# Patient Record
Sex: FEMALE | Race: WHITE | Employment: UNEMPLOYED | URBAN - METROPOLITAN AREA
[De-identification: names, ages, dates, MRNs, and addresses within clinical notes are randomized per-mention and may not be internally consistent; named-entity substitution may affect disease eponyms.]

---

## 2018-11-01 ENCOUNTER — APPOINTMENT (OUTPATIENT)
Dept: CT IMAGING | Facility: HOSPITAL | Age: 43
End: 2018-11-01
Attending: Other
Payer: MEDICAID

## 2018-11-01 ENCOUNTER — APPOINTMENT (OUTPATIENT)
Dept: GENERAL RADIOLOGY | Facility: HOSPITAL | Age: 43
End: 2018-11-01
Attending: OBSTETRICS & GYNECOLOGY
Payer: MEDICAID

## 2018-11-01 ENCOUNTER — ANESTHESIA (OUTPATIENT)
Dept: OBGYN UNIT | Facility: HOSPITAL | Age: 43
End: 2018-11-01
Payer: MEDICAID

## 2018-11-01 ENCOUNTER — APPOINTMENT (OUTPATIENT)
Dept: CT IMAGING | Facility: HOSPITAL | Age: 43
End: 2018-11-01
Attending: OBSTETRICS & GYNECOLOGY
Payer: MEDICAID

## 2018-11-01 ENCOUNTER — ANESTHESIA EVENT (OUTPATIENT)
Dept: OBGYN UNIT | Facility: HOSPITAL | Age: 43
End: 2018-11-01
Payer: MEDICAID

## 2018-11-01 ENCOUNTER — HOSPITAL ENCOUNTER (INPATIENT)
Facility: HOSPITAL | Age: 43
LOS: 19 days | Discharge: HOME | End: 2018-11-20
Attending: OBSTETRICS & GYNECOLOGY | Admitting: OBSTETRICS & GYNECOLOGY
Payer: MEDICAID

## 2018-11-01 DIAGNOSIS — I60.7 RUPTURED CEREBRAL ANEURYSM (HCC): Primary | ICD-10-CM

## 2018-11-01 PROBLEM — Z34.90 NORMAL PREGNANCY: Status: ACTIVE | Noted: 2018-11-01

## 2018-11-01 PROCEDURE — 59514 CESAREAN DELIVERY ONLY: CPT | Performed by: OBSTETRICS & GYNECOLOGY

## 2018-11-01 PROCEDURE — 009630Z DRAINAGE OF CEREBRAL VENTRICLE WITH DRAINAGE DEVICE, PERCUTANEOUS APPROACH: ICD-10-PCS | Performed by: NEUROLOGICAL SURGERY

## 2018-11-01 PROCEDURE — 99221 1ST HOSP IP/OBS SF/LOW 40: CPT | Performed by: NEUROLOGICAL SURGERY

## 2018-11-01 PROCEDURE — 70450 CT HEAD/BRAIN W/O DYE: CPT | Performed by: OBSTETRICS & GYNECOLOGY

## 2018-11-01 PROCEDURE — 71045 X-RAY EXAM CHEST 1 VIEW: CPT | Performed by: OBSTETRICS & GYNECOLOGY

## 2018-11-01 PROCEDURE — 70496 CT ANGIOGRAPHY HEAD: CPT | Performed by: OTHER

## 2018-11-01 RX ORDER — LIDOCAINE HYDROCHLORIDE AND EPINEPHRINE 10; 10 MG/ML; UG/ML
INJECTION, SOLUTION INFILTRATION; PERINEURAL
Status: COMPLETED
Start: 2018-11-01 | End: 2018-11-01

## 2018-11-01 RX ORDER — DIPHENHYDRAMINE HYDROCHLORIDE 50 MG/ML
12.5 INJECTION INTRAMUSCULAR; INTRAVENOUS EVERY 4 HOURS PRN
Status: DISCONTINUED | OUTPATIENT
Start: 2018-11-01 | End: 2018-11-16

## 2018-11-01 RX ORDER — LIDOCAINE HYDROCHLORIDE 10 MG/ML
INJECTION, SOLUTION EPIDURAL; INFILTRATION; INTRACAUDAL; PERINEURAL
Status: DISCONTINUED
Start: 2018-11-01 | End: 2018-11-01 | Stop reason: WASHOUT

## 2018-11-01 RX ORDER — POLYETHYLENE GLYCOL 3350 17 G/17G
17 POWDER, FOR SOLUTION ORAL DAILY PRN
Status: DISCONTINUED | OUTPATIENT
Start: 2018-11-01 | End: 2018-11-20

## 2018-11-01 RX ORDER — CARBOPROST TROMETHAMINE 250 UG/ML
INJECTION, SOLUTION INTRAMUSCULAR
Status: DISCONTINUED
Start: 2018-11-01 | End: 2018-11-01 | Stop reason: WASHOUT

## 2018-11-01 RX ORDER — BISACODYL 10 MG
10 SUPPOSITORY, RECTAL RECTAL
Status: DISCONTINUED | OUTPATIENT
Start: 2018-11-01 | End: 2018-11-20

## 2018-11-01 RX ORDER — DOCUSATE SODIUM 100 MG/1
100 CAPSULE, LIQUID FILLED ORAL 2 TIMES DAILY
Status: DISCONTINUED | OUTPATIENT
Start: 2018-11-01 | End: 2018-11-16

## 2018-11-01 RX ORDER — NALBUPHINE HCL 10 MG/ML
2.5 AMPUL (ML) INJECTION EVERY 4 HOURS PRN
Status: DISCONTINUED | OUTPATIENT
Start: 2018-11-01 | End: 2018-11-16

## 2018-11-01 RX ORDER — CARBOPROST TROMETHAMINE 250 UG/ML
250 INJECTION, SOLUTION INTRAMUSCULAR ONCE
Status: DISCONTINUED | OUTPATIENT
Start: 2018-11-01 | End: 2018-11-20

## 2018-11-01 RX ORDER — SODIUM CHLORIDE, SODIUM LACTATE, POTASSIUM CHLORIDE, CALCIUM CHLORIDE 600; 310; 30; 20 MG/100ML; MG/100ML; MG/100ML; MG/100ML
INJECTION, SOLUTION INTRAVENOUS CONTINUOUS
Status: DISCONTINUED | OUTPATIENT
Start: 2018-11-01 | End: 2018-11-01 | Stop reason: HOSPADM

## 2018-11-01 RX ORDER — BETAMETHASONE SODIUM PHOSPHATE AND BETAMETHASONE ACETATE 3; 3 MG/ML; MG/ML
INJECTION, SUSPENSION INTRA-ARTICULAR; INTRALESIONAL; INTRAMUSCULAR; SOFT TISSUE
Status: DISPENSED
Start: 2018-11-01 | End: 2018-11-02

## 2018-11-01 RX ORDER — CEFAZOLIN SODIUM/WATER 2 G/20 ML
SYRINGE (ML) INTRAVENOUS
Status: DISPENSED
Start: 2018-11-01 | End: 2018-11-02

## 2018-11-01 RX ORDER — ACETAMINOPHEN 325 MG/1
650 TABLET ORAL EVERY 4 HOURS PRN
Status: DISCONTINUED | OUTPATIENT
Start: 2018-11-01 | End: 2018-11-02

## 2018-11-01 RX ORDER — ACETAMINOPHEN 650 MG/1
650 SUPPOSITORY RECTAL EVERY 4 HOURS PRN
Status: DISCONTINUED | OUTPATIENT
Start: 2018-11-01 | End: 2018-11-02

## 2018-11-01 RX ORDER — LORAZEPAM 2 MG/ML
1 INJECTION INTRAMUSCULAR
Status: DISCONTINUED | OUTPATIENT
Start: 2018-11-01 | End: 2018-11-16

## 2018-11-01 RX ORDER — ONDANSETRON 2 MG/ML
4 INJECTION INTRAMUSCULAR; INTRAVENOUS EVERY 4 HOURS PRN
Status: DISCONTINUED | OUTPATIENT
Start: 2018-11-01 | End: 2018-11-20

## 2018-11-01 RX ORDER — IBUPROFEN 200 MG
200 TABLET ORAL ONCE
Status: ON HOLD | COMMUNITY
End: 2018-11-19

## 2018-11-01 RX ORDER — IBUPROFEN 600 MG/1
600 TABLET ORAL ONCE AS NEEDED
Status: DISCONTINUED | OUTPATIENT
Start: 2018-11-01 | End: 2018-11-01 | Stop reason: HOSPADM

## 2018-11-01 RX ORDER — LIDOCAINE HYDROCHLORIDE 10 MG/ML
20 INJECTION, SOLUTION EPIDURAL; INFILTRATION; INTRACAUDAL; PERINEURAL ONCE
Status: DISCONTINUED | OUTPATIENT
Start: 2018-11-01 | End: 2018-11-20

## 2018-11-01 RX ORDER — SODIUM PHOSPHATE, DIBASIC AND SODIUM PHOSPHATE, MONOBASIC 7; 19 G/133ML; G/133ML
1 ENEMA RECTAL ONCE AS NEEDED
Status: DISCONTINUED | OUTPATIENT
Start: 2018-11-01 | End: 2018-11-20

## 2018-11-01 RX ORDER — DEXTROSE, SODIUM CHLORIDE, SODIUM LACTATE, POTASSIUM CHLORIDE, AND CALCIUM CHLORIDE 5; .6; .31; .03; .02 G/100ML; G/100ML; G/100ML; G/100ML; G/100ML
INJECTION, SOLUTION INTRAVENOUS AS NEEDED
Status: DISCONTINUED | OUTPATIENT
Start: 2018-11-01 | End: 2018-11-01 | Stop reason: HOSPADM

## 2018-11-01 RX ORDER — LABETALOL HYDROCHLORIDE 5 MG/ML
10 INJECTION, SOLUTION INTRAVENOUS EVERY 10 MIN PRN
Status: DISCONTINUED | OUTPATIENT
Start: 2018-11-01 | End: 2018-11-16

## 2018-11-01 RX ORDER — SODIUM CHLORIDE 9 MG/ML
INJECTION, SOLUTION INTRAVENOUS CONTINUOUS
Status: ACTIVE | OUTPATIENT
Start: 2018-11-01 | End: 2018-11-03

## 2018-11-01 RX ORDER — CEFAZOLIN SODIUM/WATER 2 G/20 ML
SYRINGE (ML) INTRAVENOUS
Status: DISCONTINUED | OUTPATIENT
Start: 2018-11-01 | End: 2018-11-01 | Stop reason: HOSPADM

## 2018-11-01 RX ORDER — BETAMETHASONE SODIUM PHOSPHATE AND BETAMETHASONE ACETATE 3; 3 MG/ML; MG/ML
12 INJECTION, SUSPENSION INTRA-ARTICULAR; INTRALESIONAL; INTRAMUSCULAR; SOFT TISSUE EVERY 24 HOURS
Status: DISCONTINUED | OUTPATIENT
Start: 2018-11-01 | End: 2018-11-02

## 2018-11-01 RX ORDER — SENNOSIDES 8.6 MG
17.2 TABLET ORAL NIGHTLY
Status: DISCONTINUED | OUTPATIENT
Start: 2018-11-01 | End: 2018-11-20

## 2018-11-01 RX ORDER — MORPHINE SULFATE 4 MG/ML
1 INJECTION, SOLUTION INTRAMUSCULAR; INTRAVENOUS EVERY 2 HOUR PRN
Status: DISCONTINUED | OUTPATIENT
Start: 2018-11-01 | End: 2018-11-16

## 2018-11-01 RX ORDER — CEFAZOLIN SODIUM/WATER 2 G/20 ML
2 SYRINGE (ML) INTRAVENOUS
Status: DISCONTINUED | OUTPATIENT
Start: 2018-11-01 | End: 2018-11-01 | Stop reason: HOSPADM

## 2018-11-01 RX ORDER — ONDANSETRON 2 MG/ML
4 INJECTION INTRAMUSCULAR; INTRAVENOUS EVERY 6 HOURS PRN
Status: DISCONTINUED | OUTPATIENT
Start: 2018-11-01 | End: 2018-11-02

## 2018-11-01 RX ORDER — TERBUTALINE SULFATE 1 MG/ML
0.25 INJECTION, SOLUTION SUBCUTANEOUS AS NEEDED
Status: DISCONTINUED | OUTPATIENT
Start: 2018-11-01 | End: 2018-11-01 | Stop reason: HOSPADM

## 2018-11-01 RX ORDER — TRISODIUM CITRATE DIHYDRATE AND CITRIC ACID MONOHYDRATE 500; 334 MG/5ML; MG/5ML
30 SOLUTION ORAL AS NEEDED
Status: DISCONTINUED | OUTPATIENT
Start: 2018-11-01 | End: 2018-11-01 | Stop reason: HOSPADM

## 2018-11-01 RX ORDER — NALOXONE HYDROCHLORIDE 0.4 MG/ML
0.08 INJECTION, SOLUTION INTRAMUSCULAR; INTRAVENOUS; SUBCUTANEOUS
Status: DISCONTINUED | OUTPATIENT
Start: 2018-11-01 | End: 2018-11-16

## 2018-11-01 RX ORDER — METOCLOPRAMIDE HYDROCHLORIDE 5 MG/ML
10 INJECTION INTRAMUSCULAR; INTRAVENOUS EVERY 8 HOURS PRN
Status: DISCONTINUED | OUTPATIENT
Start: 2018-11-01 | End: 2018-11-20

## 2018-11-01 RX ORDER — PRENATAL VIT/IRON FUM/FOLIC AC 27 MG-1 MG
TABLET ORAL
Status: ON HOLD | COMMUNITY
End: 2018-11-19

## 2018-11-01 RX ORDER — ONDANSETRON 2 MG/ML
4 INJECTION INTRAMUSCULAR; INTRAVENOUS EVERY 6 HOURS PRN
Status: DISCONTINUED | OUTPATIENT
Start: 2018-11-01 | End: 2018-11-01

## 2018-11-01 RX ORDER — MORPHINE SULFATE 4 MG/ML
2 INJECTION, SOLUTION INTRAMUSCULAR; INTRAVENOUS EVERY 2 HOUR PRN
Status: DISCONTINUED | OUTPATIENT
Start: 2018-11-01 | End: 2018-11-16

## 2018-11-01 NOTE — CONSULTS
[facility]  Neurosurgery Consult  2018    Pedro Rivers Patient Status:  Inpatient    9/3/1975 MRN [id number]   [facility] 6NE-A Attending Chula Landry, 160[address] [facility] Day # 0 PCP No primary care provider on file.      DEE DEE imaging  Patient still awakened from anesthesia from her   The patient is easily arousable and very conversive once awake  We will follow closely      Luh Little MD   [address]  2018

## 2018-11-01 NOTE — CONSULTS
[facility]  Neurocritical Care       Name: Jaison Jacobsen  MRN: [id number]  Admission Date/Time: 11/1/2018  1:04 PM  Primary Care Provider:  No primary care provider on file.         Reason for Consultation:   SAH    HPI:   Zakia Other Topics      Concerns:        Not on file    Social History Narrative      Not on file    No family history on file.     Current Meds:    Current Facility-Administered Medications:  betamethasone sod phos & acet (CELESTONE) 6 (3-3) MG/ML injection 12 m mL 30 mL Oral Daily PRN   bisacodyl (DULCOLAX) rectal suppository 10 mg 10 mg Rectal Daily PRN   FLEET ENEMA (FLEET) 7-19 GM/118ML enema 133 mL 1 enema Rectal Once PRN   ondansetron HCl (ZOFRAN) injection 4 mg 4 mg Intravenous Q6H PRN   Or      Metoclopram hemorrhage, most pronounced at the basilar cisterns, and cervical cranial junction. There is also a small amount of intraventricular blood layering in both lateral ventricles. The gray-white matter interfaces indistinct.   There is a slender arachnoid cys pending for potential EVD  - Keppra 2gm followed by 1gm BID  - platelets and coag wnl  - Nimotop initiation delayed due to stabilization of airway and prolonged neurointerventional needs  Cardiac:  - Systolic BP Goal <401  - EKG and cardiac monitor wnl  -

## 2018-11-01 NOTE — IMAGING NOTE
Indication: Patient with seizures. ____________________________________________________________________________  History: Age: 44 years. Maternal age at [facility] 40: 44 years.   ____________________________________________________________________________  Dating:

## 2018-11-01 NOTE — PROGRESS NOTES
Pt's daughter Kerri Amador called, notified of pt's change in condition due to new information obtained after CT scan, pt's daughter asked to return to hospital to give necessary medical history/ information and provide consents for pt.  Pt's daughter tearful, re

## 2018-11-01 NOTE — CONSULTS
[facility]    Maternal-Fetal Medicine Inpatient Consultation    Date of Admission:  11/1/2018  Date of Consult:  11/1/2018    Reason for Consult:   Suspected eclampsia    History of Present Illness:  Isreal Ranulfo is a a(n) 44year old female G4P radiology to review the case.       Review of History:      OB History:    OB History    Para Term  AB Living   1             SAB TAB Ectopic Multiple Live Births                  # Outcome Date GA Lbr Corbin/2nd Weight Sex Delivery Anes PTL Lv ECLAMPSIA  In light of this immediate delivery is advised. Magnesium sulfate was initiated. Magnesium sulfate should be continued postpartum for at least 24 hours after delivery.   The patient's blood pressure was stable upon admission and did not warrant

## 2018-11-01 NOTE — PROGRESS NOTES
[facility] Pharmacy Note:  Pain Consult    Melissa Patel is a 44year old female started on Dilaudid PCA by Dr. Joselito Nguyễn. Pharmacy was consulted to review medication profile and to discontinue previously ordered narcotics and sedatives.     Medication

## 2018-11-01 NOTE — ANESTHESIA POSTPROCEDURE EVALUATION
[address] Patient Status:  Inpatient   Age/Gender 44year old female MRN [id number]   Location [address] [facility] Attending Ashlyn Jarvis, Valdo Houston, [address] [facility] Day # 0 PCP No primary care provider on file.        Anesth

## 2018-11-01 NOTE — ANESTHESIA PREPROCEDURE EVALUATION
PRE-OP EVALUATION    Patient Name: Chepe Costa    Pre-op Diagnosis: * No pre-op diagnosis entered *    Procedure(s):      Surgeon(s) and Role:     * Heather Reagan DO - Bob    Pre-op vitals reviewed.         There is no height or weight on roxy Patient has no allergy information on record.       Anesthesia Evaluation        Anesthetic Complications  (-) history of anesthetic complications         GI/Hepatic/Renal                                 Cardiovascular  Comment: Unable to fully assess, labs

## 2018-11-01 NOTE — OPERATIVE REPORT
[facility]    Post-Operative Note    Cyndy Albrecht Patient Status:  Inpatient    9/3/1975 MRN [id number]   Location 18[address] [facility] Attending Damián Chavez, [address] [facility] Day # 0 PCP No primary care provider on file. The cervix was dilated with a ring forcep which was then passed off of the field. The bladder blade was replaced. The first layer of the hysterotomy was closed using 0-vycril . A second imbricating layer was placed with same suture.   The incision was in

## 2018-11-01 NOTE — PROGRESS NOTES
Pt in OB Recovery room, able to answer minimal questions, in pain after emergent  section, unable to complete admission at this time, no family at bedside, daughter in route. Will attempt to obtain medical history once family at bedside.

## 2018-11-01 NOTE — PROGRESS NOTES
11/01/18 7998   Clinical Encounter Type   Visited With Patient not available;Health care provider  (RN)    followed up on consult for spiritual care. Nursing stated patient has a daughter, but she is not available at this time.   Please page damaris

## 2018-11-01 NOTE — PROGRESS NOTES
Patient brought to CT scan in bed. This RN and Lissette Haider RN and transportation escorted pt to CAT Scan.

## 2018-11-01 NOTE — PROGRESS NOTES
In CT Scan with patient. Radiologist spoke with this RN and instructed me to take her immediately to ICU. ICU bed obtained. Pt put on tele monitor and O2 per NC at 2L.   Anesthesia Dr Cecile Streeter occompanied and this RN and Karen Wang RN transferred pt to ICU o

## 2018-11-01 NOTE — H&P
Vear Sandifer is a 44year old female  No LMP recorded. Patient is pregnant. No chief complaint on file.   .  Patient presented by ambulance had seizure at home, about 31 weeks by [facility], limitewezekiel history, patient postictal  OBSTETRICS HISTORY:  OB Hi discharge, vaginal itching  Musculoskeletal:  denies back pain. Skin/Breast:  Denies any breast pain, lumps, or discharge. Neurological:  denies headaches, extremity weakness or numbness. Psychiatric: denies depression or anxiety.   Endocrine:   denies

## 2018-11-02 ENCOUNTER — ANESTHESIA (OUTPATIENT)
Dept: PERIOP | Facility: HOSPITAL | Age: 43
End: 2018-11-02

## 2018-11-02 ENCOUNTER — APPOINTMENT (OUTPATIENT)
Dept: CV DIAGNOSTICS | Facility: HOSPITAL | Age: 43
End: 2018-11-02
Attending: Other
Payer: MEDICAID

## 2018-11-02 ENCOUNTER — APPOINTMENT (OUTPATIENT)
Dept: INTERVENTIONAL RADIOLOGY/VASCULAR | Facility: HOSPITAL | Age: 43
End: 2018-11-02
Payer: MEDICAID

## 2018-11-02 ENCOUNTER — ANESTHESIA EVENT (OUTPATIENT)
Dept: PERIOP | Facility: HOSPITAL | Age: 43
End: 2018-11-02

## 2018-11-02 ENCOUNTER — APPOINTMENT (OUTPATIENT)
Dept: ULTRASOUND IMAGING | Facility: HOSPITAL | Age: 43
End: 2018-11-02
Attending: Other
Payer: MEDICAID

## 2018-11-02 ENCOUNTER — APPOINTMENT (OUTPATIENT)
Dept: CT IMAGING | Facility: HOSPITAL | Age: 43
End: 2018-11-02
Attending: NURSE PRACTITIONER
Payer: MEDICAID

## 2018-11-02 PROCEDURE — 93886 INTRACRANIAL COMPLETE STUDY: CPT | Performed by: OTHER

## 2018-11-02 PROCEDURE — 30233N1 TRANSFUSION OF NONAUTOLOGOUS RED BLOOD CELLS INTO PERIPHERAL VEIN, PERCUTANEOUS APPROACH: ICD-10-PCS | Performed by: OBSTETRICS & GYNECOLOGY

## 2018-11-02 PROCEDURE — B31RYZZ FLUOROSCOPY OF INTRACRANIAL ARTERIES USING OTHER CONTRAST: ICD-10-PCS

## 2018-11-02 PROCEDURE — B31CYZZ FLUOROSCOPY OF BILATERAL EXTERNAL CAROTID ARTERIES USING OTHER CONTRAST: ICD-10-PCS

## 2018-11-02 PROCEDURE — B31QYZZ FLUOROSCOPY OF CERVICO-CEREBRAL ARCH USING OTHER CONTRAST: ICD-10-PCS

## 2018-11-02 PROCEDURE — B318YZZ FLUOROSCOPY OF BILATERAL INTERNAL CAROTID ARTERIES USING OTHER CONTRAST: ICD-10-PCS

## 2018-11-02 PROCEDURE — B312YZZ FLUOROSCOPY OF LEFT SUBCLAVIAN ARTERY USING OTHER CONTRAST: ICD-10-PCS

## 2018-11-02 PROCEDURE — B31GYZZ FLUOROSCOPY OF BILATERAL VERTEBRAL ARTERIES USING OTHER CONTRAST: ICD-10-PCS

## 2018-11-02 PROCEDURE — B315YZZ FLUOROSCOPY OF BILATERAL COMMON CAROTID ARTERIES USING OTHER CONTRAST: ICD-10-PCS

## 2018-11-02 PROCEDURE — 70450 CT HEAD/BRAIN W/O DYE: CPT | Performed by: NURSE PRACTITIONER

## 2018-11-02 PROCEDURE — B31LYZZ FLUOROSCOPY OF INTERCOSTAL AND BRONCHIAL ARTERIES USING OTHER CONTRAST: ICD-10-PCS

## 2018-11-02 PROCEDURE — 03LG3DZ OCCLUSION OF INTRACRANIAL ARTERY WITH INTRALUMINAL DEVICE, PERCUTANEOUS APPROACH: ICD-10-PCS

## 2018-11-02 PROCEDURE — B311YZZ FLUOROSCOPY OF RIGHT BRACHIOCEPHALIC-SUBCLAVIAN ARTERY USING OTHER CONTRAST: ICD-10-PCS

## 2018-11-02 RX ORDER — LIDOCAINE HYDROCHLORIDE 10 MG/ML
INJECTION, SOLUTION INFILTRATION; PERINEURAL
Status: COMPLETED
Start: 2018-11-02 | End: 2018-11-02

## 2018-11-02 RX ORDER — HYDROCODONE BITARTRATE AND ACETAMINOPHEN 5; 325 MG/1; MG/1
2 TABLET ORAL AS NEEDED
Status: COMPLETED | OUTPATIENT
Start: 2018-11-02 | End: 2018-11-03

## 2018-11-02 RX ORDER — CEFAZOLIN SODIUM 1 G/3ML
INJECTION, POWDER, FOR SOLUTION INTRAMUSCULAR; INTRAVENOUS
Status: COMPLETED
Start: 2018-11-02 | End: 2018-11-02

## 2018-11-02 RX ORDER — ASPIRIN 325 MG
325 TABLET ORAL DAILY
Status: DISCONTINUED | OUTPATIENT
Start: 2018-11-02 | End: 2018-11-20

## 2018-11-02 RX ORDER — NALOXONE HYDROCHLORIDE 0.4 MG/ML
80 INJECTION, SOLUTION INTRAMUSCULAR; INTRAVENOUS; SUBCUTANEOUS AS NEEDED
Status: ACTIVE | OUTPATIENT
Start: 2018-11-02 | End: 2018-11-02

## 2018-11-02 RX ORDER — HYDROCODONE BITARTRATE AND ACETAMINOPHEN 5; 325 MG/1; MG/1
1 TABLET ORAL AS NEEDED
Status: COMPLETED | OUTPATIENT
Start: 2018-11-02 | End: 2018-11-03

## 2018-11-02 RX ORDER — DIPHENHYDRAMINE HYDROCHLORIDE 50 MG/ML
25 INJECTION INTRAMUSCULAR; INTRAVENOUS ONCE AS NEEDED
Status: ACTIVE | OUTPATIENT
Start: 2018-11-02 | End: 2018-11-02

## 2018-11-02 RX ORDER — NALOXONE HYDROCHLORIDE 0.4 MG/ML
80 INJECTION, SOLUTION INTRAMUSCULAR; INTRAVENOUS; SUBCUTANEOUS AS NEEDED
Status: ACTIVE | OUTPATIENT
Start: 2018-11-02 | End: 2018-11-03

## 2018-11-02 RX ORDER — SODIUM CHLORIDE 9 MG/ML
INJECTION, SOLUTION INTRAVENOUS ONCE
Status: DISCONTINUED | OUTPATIENT
Start: 2018-11-02 | End: 2018-11-15

## 2018-11-02 RX ORDER — KETOROLAC TROMETHAMINE 30 MG/ML
30 INJECTION, SOLUTION INTRAMUSCULAR; INTRAVENOUS ONCE AS NEEDED
Status: ACTIVE | OUTPATIENT
Start: 2018-11-02 | End: 2018-11-02

## 2018-11-02 RX ORDER — SODIUM CHLORIDE, SODIUM LACTATE, POTASSIUM CHLORIDE, CALCIUM CHLORIDE 600; 310; 30; 20 MG/100ML; MG/100ML; MG/100ML; MG/100ML
INJECTION, SOLUTION INTRAVENOUS CONTINUOUS
Status: DISCONTINUED | OUTPATIENT
Start: 2018-11-02 | End: 2018-11-05

## 2018-11-02 RX ORDER — NALBUPHINE HCL 10 MG/ML
2.5 AMPUL (ML) INJECTION
Status: DISCONTINUED | OUTPATIENT
Start: 2018-11-02 | End: 2018-11-16

## 2018-11-02 RX ORDER — MIDAZOLAM HYDROCHLORIDE 1 MG/ML
1 INJECTION INTRAMUSCULAR; INTRAVENOUS EVERY 5 MIN PRN
Status: ACTIVE | OUTPATIENT
Start: 2018-11-02 | End: 2018-11-02

## 2018-11-02 RX ORDER — ONDANSETRON 2 MG/ML
4 INJECTION INTRAMUSCULAR; INTRAVENOUS ONCE AS NEEDED
Status: ACTIVE | OUTPATIENT
Start: 2018-11-02 | End: 2018-11-02

## 2018-11-02 RX ORDER — ASPIRIN 300 MG
300 SUPPOSITORY, RECTAL RECTAL DAILY
Status: DISCONTINUED | OUTPATIENT
Start: 2018-11-02 | End: 2018-11-19

## 2018-11-02 RX ORDER — ONDANSETRON 2 MG/ML
4 INJECTION INTRAMUSCULAR; INTRAVENOUS AS NEEDED
Status: ACTIVE | OUTPATIENT
Start: 2018-11-02 | End: 2018-11-02

## 2018-11-02 RX ORDER — ACETAMINOPHEN 10 MG/ML
1000 INJECTION, SOLUTION INTRAVENOUS EVERY 6 HOURS PRN
Status: DISCONTINUED | OUTPATIENT
Start: 2018-11-02 | End: 2018-11-05

## 2018-11-02 RX ORDER — ASPIRIN 300 MG
300 SUPPOSITORY, RECTAL RECTAL DAILY
Status: DISCONTINUED | OUTPATIENT
Start: 2018-11-02 | End: 2018-11-02 | Stop reason: SDUPTHER

## 2018-11-02 RX ORDER — ASPIRIN 325 MG
325 TABLET ORAL DAILY
Status: DISCONTINUED | OUTPATIENT
Start: 2018-11-02 | End: 2018-11-02 | Stop reason: SDUPTHER

## 2018-11-02 RX ORDER — PHENYLEPHRINE HCL IN 0.9% NACL 50MG/250ML
PLASTIC BAG, INJECTION (ML) INTRAVENOUS CONTINUOUS
Status: DISCONTINUED | OUTPATIENT
Start: 2018-11-02 | End: 2018-11-16

## 2018-11-02 RX ORDER — MORPHINE SULFATE 4 MG/ML
2 INJECTION, SOLUTION INTRAMUSCULAR; INTRAVENOUS EVERY 5 MIN PRN
Status: ACTIVE | OUTPATIENT
Start: 2018-11-02 | End: 2018-11-02

## 2018-11-02 RX ORDER — HEPARIN SODIUM 5000 [USP'U]/ML
INJECTION, SOLUTION INTRAVENOUS; SUBCUTANEOUS
Status: COMPLETED
Start: 2018-11-02 | End: 2018-11-02

## 2018-11-02 RX ORDER — METOCLOPRAMIDE HYDROCHLORIDE 5 MG/ML
10 INJECTION INTRAMUSCULAR; INTRAVENOUS AS NEEDED
Status: ACTIVE | OUTPATIENT
Start: 2018-11-02 | End: 2018-11-02

## 2018-11-02 RX ORDER — VERAPAMIL HYDROCHLORIDE 2.5 MG/ML
INJECTION, SOLUTION INTRAVENOUS
Status: DISCONTINUED
Start: 2018-11-02 | End: 2018-11-02 | Stop reason: WASHOUT

## 2018-11-02 NOTE — PROGRESS NOTES
11/02/18 1824   Clinical Encounter Type   Visited With Patient not available   Routine Visit ( responding to consult requesting spiritual counseling.)      attempted to initiate a pastoral care visit with patient per consult requesting s

## 2018-11-02 NOTE — ANESTHESIA PREPROCEDURE EVALUATION
PRE-OP EVALUATION    Patient Name: Radha Batista    Pre-op Diagnosis: * No pre-op diagnosis entered *    * No procedures listed *    * No surgeons found in log *    Pre-op vitals reviewed.   Temp: 97.9 °F (36.6 °C)  Pulse: 88  Resp: 20  BP: 144/87 0.9 % 100 mL IVPB 1,000 mg Intravenous Q12H   NiMODipine (NIMOTOP) 60 MG/20ML oral solution 60 mg 60 mg Oral 6 times per day   0.9%  NaCl infusion  Intravenous Continuous   acetaminophen (TYLENOL) tab 650 mg 650 mg Oral Q4H PRN   Or      acetaminophen (TYL GI/Hepatic/Renal                                 Cardiovascular      ECG reviewed.           (+) obesity                                       Endo/Other                       (+) thrombocytopenia           Pulmonary                           Neuro/Psych

## 2018-11-02 NOTE — PROGRESS NOTES
[facility]  Neurosurgery Progress Note    Radha Batista Patient Status:  Inpatient    9/3/1975 MRN [id number]   [facility] 6NE-A Attending Gonzales Henry, [address] [facility] Day # 1 PCP No primary care provider on file.      Subjecti Extensive subarachnoid hemorrhage is again demonstrated in the basilar cisterns and extending to the cervicocranial junction, greater on the left. Increased lateral and 3rd ventricle enlargement.   Dependent blood layering out in the occipital horns of cmH20  Do not drain more than 20 ml in one hour  If more than 20 ml output, clamp EVD for 30 minutes  Call if output consistently more than 20 ml hr  Nimodipine, BP parameters per neuroIR  Keppra per neuroCC        Viktoria Mcconnell, ASHLEYN  [address]

## 2018-11-02 NOTE — PLAN OF CARE
Assumed care of pt from OB at [facility] 81.. Pt lethargic, but able to wake up and follow commands with encouragement. Pt taken for CTA scan as ordered.  Critical results called to this RN at [phone] mentioning extensive subarachnoid and intraventricular hemorrhage a

## 2018-11-02 NOTE — PROCEDURES
rui properly identified. Incision planned. Patient sterilely prepped and draped. 6 mls of 1% lido with epi injected. Incision down to periosteum. . Twist drill craniotomy. Wound flushed. Dural opening with 11 blade.  Passage of evd with csf.  evd tu

## 2018-11-02 NOTE — ANESTHESIA POSTPROCEDURE EVALUATION
[address] Patient Status:  Inpatient   Age/Gender 44year old female MRN [id number]   [facility] 6NE-A Attending Damián Chavez, [address] [facility] Day # 1 PCP No primary care provider on file.        Anesthesia Post-o

## 2018-11-02 NOTE — PROGRESS NOTES
POD#1 s/p RCS  Patient is sedated s/p ruptured brain aneurism coil placement in nuero lab    /84   Pulse 95   Temp 99.1 °F (37.3 °C) (Temporal)   Resp 22   Ht 57\"   Wt 161 lb 6 oz   SpO2 99%   Breastfeeding?  Yes   BMI 34.92 kg/m²     Abdomen: soft,

## 2018-11-02 NOTE — PROGRESS NOTES
[address] Pharmacy Note:  Acetaminophen Therapeutic Duplication      Kimberly Galdamez is a(n) 44year old female who has been prescribed both IV acetaminophen (Ofirmev) and PO/VA acetaminophen .  PO/VA acetaminophen was discontinued per P&T approved protoc

## 2018-11-02 NOTE — OCCUPATIONAL THERAPY NOTE
OT order received. Attempted to see pt for OT eval; however pt on bedrest at this time. Spoke with RN who was requesting to follow up again tomorrow. Will check tomorrow as appropriate.

## 2018-11-02 NOTE — SLP NOTE
Order received; chart reviewed. Patient currently out of room for procedure. Will f/u tomorrow as appropriate.    Kimi Mayfield , pager [phone]

## 2018-11-02 NOTE — PLAN OF CARE
Problem: NEUROLOGICAL - ADULT  Goal: Achieves stable or improved neurological status  INTERVENTIONS  - Assess for and report changes in neurological status  - Initiate measures to prevent increased intracranial pressure  - Maintain blood pressure and fluid symptoms of infection and hematoma. - Assess bladder function and monitor for bladder distention.  - Provide/instruct/assist with pericare as needed. - Provide VTE prophylaxis as needed.   - Monitor bowel function.  - Encourage ambulation and provide assi placed.  site dry and intact with ABD dressing. Patient given bath after EVD placement and nabil pad changed. Moderate lochia rubra on old pad, pad was not filled or saturated. Patient repositioned, EVD at 10cm H2O OTD. Daughter at bedside.  All car

## 2018-11-02 NOTE — PROGRESS NOTES
Attempted echo once in morning and once in afternoon. Patient still not back from testing. Echo will be done tomorrow in AM. RN aware.

## 2018-11-02 NOTE — PROGRESS NOTES
[facility]    Patients Name: Estuardo Gregory  Attending Physician: Virgilio Macdonald DO  CSN: [id number]    Location:  1414/4364-A  MRN: [id number]    YOB: 1975  Admission Date: 11/1/2018     Obstetric Anesthesia Pain Progress Note

## 2018-11-02 NOTE — PROCEDURES
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

## 2018-11-02 NOTE — PHYSICAL THERAPY NOTE
Order received for PT eval. Attempted to see Pt this am, however, Pt remains on bedrest and not appropriate for therapy at this time. Will follow up 11/3/18.

## 2018-11-02 NOTE — CM/SW NOTE
11/02/18 1100   CM/SW Referral Data   Referral Source Nurse;Family; Social Work (self-referral)   Reason for Referral Discharge planning;Psychoscial assessment     SW completed an assessment with , Mj Jain. Pt and  are from [place], [place].

## 2018-11-03 ENCOUNTER — APPOINTMENT (OUTPATIENT)
Dept: ULTRASOUND IMAGING | Facility: HOSPITAL | Age: 43
End: 2018-11-03
Attending: Other
Payer: MEDICAID

## 2018-11-03 ENCOUNTER — APPOINTMENT (OUTPATIENT)
Dept: CV DIAGNOSTICS | Facility: HOSPITAL | Age: 43
End: 2018-11-03
Attending: Other
Payer: MEDICAID

## 2018-11-03 ENCOUNTER — APPOINTMENT (OUTPATIENT)
Dept: CT IMAGING | Facility: HOSPITAL | Age: 43
End: 2018-11-03
Attending: NURSE PRACTITIONER
Payer: MEDICAID

## 2018-11-03 PROCEDURE — 93886 INTRACRANIAL COMPLETE STUDY: CPT | Performed by: OTHER

## 2018-11-03 PROCEDURE — 99231 SBSQ HOSP IP/OBS SF/LOW 25: CPT | Performed by: NEUROLOGICAL SURGERY

## 2018-11-03 PROCEDURE — 93306 TTE W/DOPPLER COMPLETE: CPT | Performed by: OTHER

## 2018-11-03 PROCEDURE — 99291 CRITICAL CARE FIRST HOUR: CPT | Performed by: OTHER

## 2018-11-03 PROCEDURE — 70450 CT HEAD/BRAIN W/O DYE: CPT | Performed by: NURSE PRACTITIONER

## 2018-11-03 RX ORDER — POTASSIUM CHLORIDE 20 MEQ/1
40 TABLET, EXTENDED RELEASE ORAL EVERY 4 HOURS
Status: COMPLETED | OUTPATIENT
Start: 2018-11-03 | End: 2018-11-03

## 2018-11-03 RX ORDER — NIMODIPINE 30 MG/1
60 CAPSULE, LIQUID FILLED ORAL
Status: DISCONTINUED | OUTPATIENT
Start: 2018-11-03 | End: 2018-11-17

## 2018-11-03 RX ORDER — NIMODIPINE 30 MG/1
60 CAPSULE, LIQUID FILLED ORAL
Status: DISCONTINUED | OUTPATIENT
Start: 2018-11-03 | End: 2018-11-03

## 2018-11-03 NOTE — PROGRESS NOTES
[facility]  Progress Note    Jurgen Campbell Patient Status:  Inpatient    9/3/1975 MRN [id number]   [facility] 6NE-A Attending Valentin Fine, 160[address] [facility] Day # 2 PCP No primary care provider on file.      Subjective:  No c/o o

## 2018-11-03 NOTE — PROGRESS NOTES
[facility]  Neurosurgery Progress Note  11/3/2018    Tulio Rivers Patient Status:  Inpatient    9/3/1975 MRN [id number]   [facility] 6NE-A Attending Celia Nguyen, 160[address] [facility] Day # 2 PCP No primary care provider on file. Barcode 4601    BASIC METABOLIC PANEL (8)    Collection Time: 11/03/18  4:01 AM   Result Value Ref Range    Glucose 102 (H) 70 - 99 mg/dL    Sodium 141 136 - 144 mmol/L    Potassium 3.6 3.6 - 5.1 mmol/L    Chloride 109 101 - 111 mmol/L    CO2 24.0 22.0 - 3 [address], [address]  11/3/2018  1:34 PM

## 2018-11-03 NOTE — PROGRESS NOTES
[facility]  Neurocritical Care       Name: Jason Hackett  MRN: [id number]  Admission Date/Time: 11/1/2018  1:04 PM  Primary Care Provider:  No primary care provider on file.         Interim history:   Underwent coiling and sacri Intravenous Q2H PRN   Or      morphINE sulfate (PF) 4 MG/ML injection 2 mg 2 mg Intravenous Q2H PRN   Labetalol HCl (TRANDATE) injection 10 mg 10 mg Intravenous Q10 Min PRN   Senna (SENOKOT) tab TABS 17.2 mg 17.2 mg Oral Nightly   docusate sodium (COLACE) Intrauterine pregnancy at 31 weeks, emergency  section, hypertensive, Pt in Operating room at present. TECHNIQUE:  Noncontrast CT scanning is performed through the brain. Dose reduction techniques were used.  Dose information is transmitted to the CREATSERUM  0.66  0.50*   --   0.37*   GFRAA  126  138   --   152   GFRNAA  109  120   --   132   CA  9.0  7.3*   --   7.4*   ALB  2.6*  2.0*   --    --    NA  139  139  142  141   K  3.6  4.2   --   3.6   CL  105  108   --   109   CO2  20.0*  23.0   -- procedures) was administered to manage and/or prevent neurologic instability. This involved direct patient intervention, complex decision making, and/or extensive discussions with the patient, family, and clinical staff.     Thank you for allowing me to par

## 2018-11-03 NOTE — PLAN OF CARE
Assumed care of pt @ 1406. Pt drowsy, but following commands. See flowsheet for neuro assessment. Consents signed and pt taken down to neuro intervention. EVD maintained by this RN throughout the procedure.   Pt brought from Neuro intervention directly to

## 2018-11-03 NOTE — SLP NOTE
ADULT SWALLOWING EVALUATION    ASSESSMENT    ASSESSMENT/OVERALL IMPRESSION:  Orders were received for a bedside swallow evaluation per MD orders and stroke protocol.  Patient as a 45 y/o pregnant female who presented to the hospital for seizures, severe hea Recommendations/Plan: Undetermined    HISTORY   MEDICAL HISTORY  Reason for Referral: Stroke protocol    Problem List  Active Problems:    Normal pregnancy    IUGR (intrauterine growth restriction) in prior pregnancy, pregnant    Eclampsia    [facility]: Unlabored  Consistencies Trialed: Thin liquids;Puree;Hard solid  Method of Presentation: Staff/Clinician assistance;Spoon;Straw;Single sips; Consecutive swallows  Patient Positioning: Upright;Midline    Oral Phase of Swallow:  Within Functional Limit

## 2018-11-03 NOTE — PROGRESS NOTES
[facility]  Interventional Neuroradiology Progress Note    [facility] Patient Status:  Inpatient    9/3/1975 MRN [id number]   [facility] 6NE-A Attending Meagan Perez, 1604 [facility] Day # 2 PCP No primary care provider on f 11/3/2018 0816  Last data filed at 11/3/2018 0700  Gross per 24 hour   Intake 2950 ml   Output 8424 ml   Net -5474 ml       Imaging:  TCDs 11/3/18:  R MCA/ICA 0.9 (1.9)  L MCA/ICA 0.9 (1.1)  Basilar velocity 92 (54)  CONCLUSION:  No sonographic evidence of

## 2018-11-03 NOTE — PHYSICAL THERAPY NOTE
PHYSICAL THERAPY EVALUATION - INPATIENT     Room Number: 2249/6734-O  Evaluation Date: 11/3/2018  Type of Evaluation: Initial  Physician Order: PT Eval and Treat    Presenting Problem: PICA aneurysm rupture s/p coil embolization and EVD placement, em correct.      RANGE OF MOTION AND STRENGTH ASSESSMENT  Upper extremity ROM and strength are within functional limits     Lower extremity ROM is within functional limits     Lower extremity strength is within functional limits       VESTIBULAR TESTING  Centr (ft): 2, 8  Assistive Device: None  Pattern: Shuffle          Skilled Therapy Provided: Pt received supine in bed and is agreeable to therapy. Pt eager to use commode. Pt educated on log roll technique. Pt supine-sit with MIN assist for trunk support.  Pt m stair negotiation. The patient is below baseline and would benefit from skilled inpatient PT to address the above deficits to assist patient in returning to prior to level of function. Recommend Pt D/C home.      DISCHARGE RECOMMENDATIONS  PT Discharge Glynn

## 2018-11-03 NOTE — PLAN OF CARE
POSTPARTUM    • Long Term Goal:Experiences normal postpartum course Progressing    • Appropriate maternal -  bonding Progressing        Patient in bed, AOx4, postpartum check done, and instructed with her Primary Nurse in ICU regarding dry dressing

## 2018-11-03 NOTE — PROGRESS NOTES
Assumed care of pt at [address] [facility]. Pt has had large amounts of urine out every hour since 1900.  2000-350, 2100-450, 2200-400. With the amounts since 1800 total urine output for 4 hours has been 3100cc. Neuro APN notified. Orders received. Labs sent.   Awaiting

## 2018-11-03 NOTE — PROGRESS NOTES
11/03/18 1723   Clinical Encounter Type   Visited With Patient   Routine Visit Introduction   Continue Visiting No   Patient Spiritual Encounters   Spiritual Interventions  provided spiritual support for patient through prayers and blessing

## 2018-11-04 ENCOUNTER — APPOINTMENT (OUTPATIENT)
Dept: ULTRASOUND IMAGING | Facility: HOSPITAL | Age: 43
End: 2018-11-04
Attending: Other
Payer: MEDICAID

## 2018-11-04 PROCEDURE — 99231 SBSQ HOSP IP/OBS SF/LOW 25: CPT | Performed by: NEUROLOGICAL SURGERY

## 2018-11-04 PROCEDURE — 99291 CRITICAL CARE FIRST HOUR: CPT | Performed by: OTHER

## 2018-11-04 PROCEDURE — 93886 INTRACRANIAL COMPLETE STUDY: CPT | Performed by: OTHER

## 2018-11-04 PROCEDURE — 99253 IP/OBS CNSLTJ NEW/EST LOW 45: CPT | Performed by: HOSPITALIST

## 2018-11-04 RX ORDER — DEXAMETHASONE SODIUM PHOSPHATE 4 MG/ML
10 VIAL (ML) INJECTION ONCE
Status: COMPLETED | OUTPATIENT
Start: 2018-11-04 | End: 2018-11-04

## 2018-11-04 RX ORDER — SODIUM CHLORIDE 9 MG/ML
INJECTION, SOLUTION INTRAVENOUS CONTINUOUS
Status: DISCONTINUED | OUTPATIENT
Start: 2018-11-04 | End: 2018-11-15

## 2018-11-04 RX ORDER — SODIUM CHLORIDE 9 MG/ML
INJECTION, SOLUTION INTRAVENOUS CONTINUOUS
Status: DISCONTINUED | OUTPATIENT
Start: 2018-11-03 | End: 2018-11-04

## 2018-11-04 RX ORDER — DEXAMETHASONE SODIUM PHOSPHATE 4 MG/ML
4 VIAL (ML) INJECTION
Status: DISCONTINUED | OUTPATIENT
Start: 2018-11-04 | End: 2018-11-07

## 2018-11-04 RX ORDER — MAGNESIUM SULFATE HEPTAHYDRATE 40 MG/ML
2 INJECTION, SOLUTION INTRAVENOUS ONCE
Status: COMPLETED | OUTPATIENT
Start: 2018-11-04 | End: 2018-11-04

## 2018-11-04 RX ORDER — POTASSIUM CHLORIDE 20 MEQ/1
40 TABLET, EXTENDED RELEASE ORAL ONCE
Status: COMPLETED | OUTPATIENT
Start: 2018-11-04 | End: 2018-11-04

## 2018-11-04 NOTE — PROGRESS NOTES
[facility]  Neurocritical Care       Name: Fred Parker  MRN: [id number]  Admission Date/Time: 11/1/2018  1:04 PM  Primary Care Provider:  No primary care provider on file. Interim history:   No events. Feels well.   In Oral Daily PRN   magnesium hydroxide (MILK OF MAGNESIA) 400 MG/5ML suspension 30 mL 30 mL Oral Daily PRN   bisacodyl (DULCOLAX) rectal suppository 10 mg 10 mg Rectal Daily PRN   FLEET ENEMA (FLEET) 7-19 GM/118ML enema 133 mL 1 enema Rectal Once PRN   Metoc includes the Dose Index Registry. PATIENT STATED HISTORY: (As transcribed by Technologist)  Patient status post c section with seizure anf hypertensive. FINDINGS:  There is diffuse sulcal effacement.   There is dilatation of the lateral ventricles symme 109  109  111   CO2  20.0*  23.0   --   24.0  25.0  23.0   ALKPHO  196*  144*   --    --    --    --    AST  76*  65*   --    --    --    --    ALT  85*  76*   --    --    --    --    BILT  0.5  0.4   --    --    --    --    TP  7.1  5.7*   --    --    -- administered to manage and/or prevent neurologic instability. This involved direct patient intervention, complex decision making, and/or extensive discussions with the patient, family, and clinical staff.     Thank you for allowing me to participate in the

## 2018-11-04 NOTE — PROGRESS NOTES
[facility]  Interventional Neuroradiology Progress Note    Haileyjulita Espino Patient Status:  Inpatient    9/3/1975 MRN [id number]   [facility] 6NE-A Attending Sully Small, [address] [facility] Day # 3 PCP No primary care provider on f in velocities involving the left middle cerebral artery with elevated MCA/ICA ratio now elevated to 2.6, previously 0.9. This is concerning for developing vasospasm involving the left middle cerebral artery.     Labs:   Na 142 (141)  Mg 1.8 (2.1)  ASA plt

## 2018-11-04 NOTE — PROGRESS NOTES
[facility]  Neurosurgery Progress Note  2018    Koffi Carlsonman Silvestre Patient Status:  Inpatient    9/3/1975 MRN [id number]   [facility] 6NE-A Attending El Castaneda, 1604 [facility] Day # 3 PCP No primary care provider on file. Creatinine 0.50 (L) 0.55 - 1.02 mg/dL    BUN/CREA Ratio 14.0 10.0 - 20.0    Calcium, Total 7.6 (L) 8.3 - 10.3 mg/dL    Calculated Osmolality 292 275 - 295 mOsm/kg    GFR, Non- 120 >=60    GFR, -American 138 >=60   MAGNESIUM    [facility]

## 2018-11-04 NOTE — OCCUPATIONAL THERAPY NOTE
OCCUPATIONAL THERAPY EVALUATION - INPATIENT     Room Number: 0853/3490-I  Evaluation Date: 2018  Type of Evaluation: Initial  Presenting Problem: s/p coil embolization, EVD placement, and emergent     Physician Order: IP Consult to Occupation ASSESSMENT  Upper extremity ROM is within functional limits     Upper extremity strength is within functional limits     COORDINATION  Gross Motor    WFL    Fine Motor    WFL        ACTIVITIES OF DAILY LIVING ASSESSMENT  AM-PAC ‘6-Clicks’ Inpatient Daily A Inpatient Daily Activity Short Form was completed and  this patient  is demonstrating a  38.32% degree impairment in activities of daily living. Research supports that patients with this level of impairment often benefit from home with family assist.   .  I supervision    Functional Transfer Goals  Patient will perform all functional transfers:  with supervision    Additional Goals:  Pt will tolerate standing at sink with fair+ balance x 5 minutes for [facility] completion.

## 2018-11-04 NOTE — PROGRESS NOTES
Patient in bed, just get done with therapy and also came from NICU to see her baby, she verbalized she was able to hold and cuddle her baby but she is so tired as she verbalized, Dilaudid IV running from her primary nurse and desires to sleep , postpartum

## 2018-11-04 NOTE — CONSULTS
[facility]  [facility] Patient Status:  Inpatient    9/3/1975 MRN [id number]   [facility] 6NE-A Attending Armani Medeiros, 160[address] [facility] Day # 3 PCP No primary care provider on file.      Reason for consult: m systems was completed. Pertinent positives and negatives noted in the HPI. Physical Exam:    /80   Pulse 101   Temp 98.1 °F (36.7 °C) (Temporal)   Resp 16   Ht 4' 9\" (1.448 m)   Wt 146 lb 9.7 oz (66.5 kg)   SpO2 98%   Breastfeeding?  Yes   BMI --    --    --    --     < > = values in this interval not displayed.        Recent Labs   Lab  11/01/18   1432  11/02/18   0418   PTP  11.9*  13.5   INR  0.84*  0.99       Recent Labs   Lab  11/02/18   0418   TROP  0.193*       Imaging: Imaging data review

## 2018-11-04 NOTE — PROGRESS NOTES
[facility]  Progress Note    Krista Noni Patient Status:  Inpatient    9/3/1975 MRN [id number]   [facility] 6NE-A Attending Belen Causey, 160[address] [facility] Day # 3 PCP No primary care provider on file.      Subjective:   alert o

## 2018-11-05 ENCOUNTER — APPOINTMENT (OUTPATIENT)
Dept: ULTRASOUND IMAGING | Facility: HOSPITAL | Age: 43
End: 2018-11-05
Attending: Other
Payer: MEDICAID

## 2018-11-05 PROCEDURE — 93886 INTRACRANIAL COMPLETE STUDY: CPT | Performed by: OTHER

## 2018-11-05 PROCEDURE — 99231 SBSQ HOSP IP/OBS SF/LOW 25: CPT | Performed by: NEUROLOGICAL SURGERY

## 2018-11-05 PROCEDURE — 99232 SBSQ HOSP IP/OBS MODERATE 35: CPT | Performed by: INTERNAL MEDICINE

## 2018-11-05 PROCEDURE — B548ZZA ULTRASONOGRAPHY OF SUPERIOR VENA CAVA, GUIDANCE: ICD-10-PCS | Performed by: OBSTETRICS & GYNECOLOGY

## 2018-11-05 PROCEDURE — 02HV33Z INSERTION OF INFUSION DEVICE INTO SUPERIOR VENA CAVA, PERCUTANEOUS APPROACH: ICD-10-PCS | Performed by: OBSTETRICS & GYNECOLOGY

## 2018-11-05 PROCEDURE — 99291 CRITICAL CARE FIRST HOUR: CPT | Performed by: OTHER

## 2018-11-05 RX ORDER — BUTALBITAL, ACETAMINOPHEN AND CAFFEINE 50; 325; 40 MG/1; MG/1; MG/1
1 TABLET ORAL ONCE
Status: COMPLETED | OUTPATIENT
Start: 2018-11-05 | End: 2018-11-05

## 2018-11-05 RX ORDER — LIDOCAINE 50 MG/G
1 PATCH TOPICAL EVERY 24 HOURS
Status: DISCONTINUED | OUTPATIENT
Start: 2018-11-05 | End: 2018-11-05

## 2018-11-05 RX ORDER — POTASSIUM CHLORIDE 20 MEQ/1
40 TABLET, EXTENDED RELEASE ORAL EVERY 4 HOURS
Status: COMPLETED | OUTPATIENT
Start: 2018-11-05 | End: 2018-11-06

## 2018-11-05 RX ORDER — BUTALBITAL, ACETAMINOPHEN AND CAFFEINE 50; 325; 40 MG/1; MG/1; MG/1
1 TABLET ORAL EVERY 8 HOURS PRN
Status: DISCONTINUED | OUTPATIENT
Start: 2018-11-05 | End: 2018-11-13

## 2018-11-05 RX ORDER — MAGNESIUM OXIDE 400 MG (241.3 MG MAGNESIUM) TABLET
400 TABLET ONCE
Status: COMPLETED | OUTPATIENT
Start: 2018-11-05 | End: 2018-11-05

## 2018-11-05 RX ORDER — SODIUM CHLORIDE 0.9 % (FLUSH) 0.9 %
10 SYRINGE (ML) INJECTION AS NEEDED
Status: DISCONTINUED | OUTPATIENT
Start: 2018-11-05 | End: 2018-11-20

## 2018-11-05 RX ORDER — LIDOCAINE 50 MG/G
1 PATCH TOPICAL EVERY 24 HOURS
Status: DISCONTINUED | OUTPATIENT
Start: 2018-11-05 | End: 2018-11-10

## 2018-11-05 NOTE — PROGRESS NOTES
Patient received in bed. Ambulated to bathroom and in hallway. Morgan removed. Voiding without difficulty. Afterward began to complain of worsening headache/neck pain. Dr. Luz Jeter notified. No neuro changes.   Determined pain is from blood irritation to b

## 2018-11-05 NOTE — PROGRESS NOTES
As per protocol for EVD drainage- evd bag changed using sterile technique and 2 RN's. Francisco Parent assisted and EVD bag changed adhering to sterile technique. New bag placed without difficulty. Draining without difficulty.       Additional PIV started and p

## 2018-11-05 NOTE — PROGRESS NOTES
[employer]  Neurocritical Care       Subjective: Javed Gallo is a(n) 44year old female was 31 weeks pregnant who suffered a seizure and significant headache, was taken emergently for  for eclampsia.  She was found t throughout  LLE 5/5 throughout  RLE 5/5 throughout  Sensory exam normal to fine touch  Gait stable      Diagnostics:   Ct Brain Or Head (90427)    Result Date: 11/3/2018  PROCEDURE:  CT BRAIN OR HEAD (22910)  COMPARISON:  KAROL CT BRAIN OR HEAD (57953), transmitted to the  [facility]) Osmin Benton [address] ([address]) which includes the Dose Index Registry.   PATIENT STATED HISTORY: (As transcribed by Technologist)  Patient presents status-post interventional radiology aneurysm prominence of the lateral ventricles, improved since 11/1/2018. 3. Scattered sulcal effacement is again noted likely secondary to cerebral edema.   There is crowding within the posterior fossa with partial effacement of the 4th ventricle and complete efface read back.   16 04 hr.    Dictated by: Robin White MD on 11/01/2018 at 16:01     Approved by: Robin White MD            Us Transcranial Arteries Complete  (cpt=93886)    Result Date: 11/4/2018  PROCEDURE:  US TRANSCRANIAL ARTERIES COMPLETE  (CPT=93886)  COM FINDINGS:  The right-sided velocities in cm/s are as follows: ICA CERVICAL:  57 ICA TERMINUS:  43 MCA PROXIMAL:  45 MCA MID:  54 MCA DISTAL:  51 MCA/ICA RATIO:  0.9 JIMENEZ PROXIMAL:  39 JIMENEZ DISTAL:  28 PCA PROXIMAL:  18 PCA DISTAL:  26 VERTEBRAL:  35  The l CHEST AP PORTABLE  (CPT=71045)  TECHNIQUE:  AP chest radiograph was obtained. COMPARISON:  None.   INDICATIONS:  unknown gestation came in with seizure at home  PATIENT STATED HISTORY: (As transcribed by Technologist)  Patient offered no additional history hemorrhage. Patient had emergency cesarian section today. Originally presented to emergency department today after seizure. CONTRAST USED:  60cc of Omnipaque 350  FINDINGS:    No aneurysm or vascular malformation is identified.   There is mild narrowin 9491  11/03/18   0401  11/04/18   0500  11/05/18   0419   RBC  3.03*  3.78*  3.80  4.09   HGB  7.0*  9.7*  9.8*  10.7*   HCT  22.8*  30.2*  30.8*  33.7*   MCV  75.2*  79.9*  81.1  82.4   MCH  23.1*  25.7*  25.8*  26.2*   MCHC  30.7*  32.1  31.8  31.8   RDW Rectal Daily   acetaminophen (OFIRMEV) infusion 1,000 mg 1,000 mg Intravenous Q6H PRN   Naloxone HCl (NARCAN) 0.4 MG/ML injection 0.08 mg 0.08 mg Intravenous Q5 Min PRN   DiphenhydrAMINE HCl (BENADRYL) injection 12.5 mg 12.5 mg Intravenous Q4H PRN   [place] Neuro checks Q 2hr  - Pain Meds -  Morphine, Norco  - PT/OT and Speech Therapy   - 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 Khalida Mosher MD  Medical Director - Stroke and Neurocritical Care  [facility] - In affiliation with [facility].   Board Certified in Neurology, Vascular Neurology(Stroke), Neuro

## 2018-11-05 NOTE — PROGRESS NOTES
[facility]  Interventional Neuroradiology Progress Note    Katharina Cook Patient Status:  Inpatient    9/3/1975 MRN [id number]   [facility] 6NE-A Attending Meg Davidson, [address] [facility] Day # 4 PCP No primary care provider on f dexamethasone Sodium Phosphate  4 mg Intravenous [email or web address]   • nimodipine  60 mg Oral 6 times per day   • sodium chloride   Intravenous Once   • aspirin  325 mg Oral Daily    Or   • aspirin  300 mg Rectal Daily   • Carboprost Tromethamine  250 mcg Intr aneurysm progression/rupture as well as preventing clot formation at the base of the coil  4. MRA in 7 days (11/9/18)  5.  [place] following, SAH protocol in place              - daily NIHSS              - Monitor I/O closely, aim for uvolemic balance,    - IVF

## 2018-11-05 NOTE — PHYSICAL THERAPY NOTE
PHYSICAL THERAPY TREATMENT NOTE - INPATIENT    Room Number: 5002/6597-B     Session: 1   Number of Visits to Meet Established Goals: 3    Presenting Problem: PICA aneurysm rupture s/p coil embolization and EVD placement, emergent     Problem List Need to walk in hospital room?: A Little   -   Climbing 3-5 steps with a railing?: A Little       AM-PAC Score:  Raw Score: 18   PT Approx Degree of Impairment Score: 46.58%   Standardized Score (AM-PAC Scale): 43.63   CMS Modifier (G-Code): CK    FUNCTION training;Balance training  Rehab Potential : Good  Frequency (Obs): 5x/week    CURRENT GOALS     Goal #1 Patient is able to demonstrate supine - sit EOB @ level: modified independent      Goal #2 Patient is able to demonstrate transfers EOB to/from [facility] at

## 2018-11-05 NOTE — PROGRESS NOTES
[facility]  Post-Partum Caesarean Section Progress Note    Meet Hays Patient Status:  Inpatient    9/3/1975 MRN [id number]   [facility] 6NE-A Attending Aries Burgos, [address] [facility] Day # 4 PCP No primary care provider on f

## 2018-11-05 NOTE — PROGRESS NOTES
[facility]  Neurosurgery Progress Note    [facility] Patient Status:  Inpatient    9/3/1975 MRN [id number]   [facility] 6NE-A Attending Celia Nguyen, [address] [facility] Day # 4 PCP No primary care provider on file.      Subjecti changing Keppra to Depakote for headaches but would need to weigh benefits versus risks in lactating women  Will discuss with Dr. Nell Medrano on rounds        HA Nichols  11/5/2018, 8:00 AM      Patient seen and examine

## 2018-11-05 NOTE — SLP NOTE
Attempted to see pt 2 x this date. Earlier, pt was occupied with RN for PICC insertion. Currently (1335) pt occupied with OT. Pt also plans to visit baby in NICU at 1400. SLP will re-attempt 11/6/18 as able. Dara Manrique M.S.,CCC-SLP  Speech L

## 2018-11-05 NOTE — PROGRESS NOTES
[facility]  Progress Note    Evelina Davidson Patient Status:  Inpatient    9/3/1975 MRN [id number]   [facility] 6NE-A Attending Aura White, [address] [facility] Day # 4 PCP No primary care provider on file.      CC: Medical managemen 11/05/2018    K 4.0 11/05/2018    K 4.0 11/05/2018     11/05/2018    CO2 24.0 11/05/2018     11/05/2018    CA 7.8 11/05/2018    MG 2.1 11/05/2018    PHOS 3.8 11/05/2018    PGLU 91 11/05/2018       Imaging:      Transcranial ultrasound  ===== Or      morphINE sulfate (PF) 4 MG/ML injection 2 mg 2 mg Intravenous Q2H PRN   Labetalol HCl (TRANDATE) injection 10 mg 10 mg Intravenous Q10 Min PRN   Senna (SENOKOT) tab TABS 17.2 mg 17.2 mg Oral Nightly   docusate sodium (COLACE) cap 100 mg 100 mg Or home    Questions/concerns and Plan of care were discussed with patient and family by bedside.             Gregoria Cr MD  11/5/2018  1:32 PM

## 2018-11-05 NOTE — LACTATION NOTE
LC follow up visit to assess pt needs today. Pt resting with c/o head pain, stating she is unable to pump her breasts today. Support given, and plan made for pt to request LC support prn, rest and pump breasts when she feels up to it.   LC did speak with

## 2018-11-05 NOTE — OCCUPATIONAL THERAPY NOTE
OCCUPATIONAL THERAPY TREATMENT NOTE - INPATIENT     Room Number: 7748/8562-F  Session: 1  Number of Visits to Meet Established Goals: 3    Presenting Problem: s/p coil embolization, EVD placement, and emergent     History related to current admiss ACTIVITIES OF DAILY LIVING ASSESSMENT  AM-PAC ‘6-Clicks’ Inpatient Daily Activity Short Form  How much help from another person does the patient currently need…  -   Putting on and taking off regular lower body clothing?: A Little  -   Bathing (efrai Recommendations: Home       PLAN  OT Treatment Plan: Balance activities; Energy conservation/work simplification techniques;ADL training;IADL training;Functional transfer training; Endurance training;Patient/Family training;Patient/Family education; Compensat

## 2018-11-06 ENCOUNTER — APPOINTMENT (OUTPATIENT)
Dept: ULTRASOUND IMAGING | Facility: HOSPITAL | Age: 43
End: 2018-11-06
Attending: Other
Payer: MEDICAID

## 2018-11-06 PROCEDURE — 93886 INTRACRANIAL COMPLETE STUDY: CPT | Performed by: OTHER

## 2018-11-06 PROCEDURE — 99291 CRITICAL CARE FIRST HOUR: CPT | Performed by: OTHER

## 2018-11-06 PROCEDURE — 99231 SBSQ HOSP IP/OBS SF/LOW 25: CPT | Performed by: NEUROLOGICAL SURGERY

## 2018-11-06 PROCEDURE — 99232 SBSQ HOSP IP/OBS MODERATE 35: CPT | Performed by: INTERNAL MEDICINE

## 2018-11-06 RX ORDER — MAGNESIUM OXIDE 400 MG (241.3 MG MAGNESIUM) TABLET
400 TABLET ONCE
Status: COMPLETED | OUTPATIENT
Start: 2018-11-06 | End: 2018-11-06

## 2018-11-06 RX ORDER — CYCLOBENZAPRINE HCL 10 MG
10 TABLET ORAL 3 TIMES DAILY PRN
Status: DISCONTINUED | OUTPATIENT
Start: 2018-11-06 | End: 2018-11-06

## 2018-11-06 RX ORDER — CYCLOBENZAPRINE HCL 5 MG
5 TABLET ORAL 3 TIMES DAILY PRN
Status: DISCONTINUED | OUTPATIENT
Start: 2018-11-06 | End: 2018-11-16

## 2018-11-06 NOTE — PHYSICAL THERAPY NOTE
PHYSICAL THERAPY TREATMENT NOTE - INPATIENT    Room Number: 7897/7266-W     Session: 2  Number of Visits to Meet Established Goals: 3    Presenting Problem: PICA aneurysm rupture s/p coil embolization and EVD placement, emergent    History relate a chair with arms (e.g., wheelchair, bedside commode, etc.): None   -   Moving from lying on back to sitting on the side of the bed?: None   How much help from another person does the patient currently need. ..   -   Moving to and from a bed to a chair (inc goals at this time and therefore does not require further skilled IP PT at this time. Will D/C Pt from skilled IP PT.  Recommend Home upon [facility] d/c.     DISCHARGE RECOMMENDATIONS  PT Discharge Recommendations: Home     PLAN  PT Treatment Plan: Be

## 2018-11-06 NOTE — PROGRESS NOTES
[facility]  Interventional Neuroradiology Progress Note    Edyth Cons Patient Status:  Inpatient    9/3/1975 MRN [id number]   [facility] 6NE-A Attending Javan Young, [address] [facility] Day # 5 PCP No primary care provider on f for the past 72 hrs:   Weight   11/04/18 0100 146 lb 9.7 oz   11/05/18 0400 147 lb 11.3 oz   11/06/18 0600 147 lb 4.3 oz     Inpt Meds:   • lidocaine  1 patch Transdermal Q24H   • dexamethasone Sodium Phosphate  4 mg Intravenous [email or web address]   • nimodipin 130-180 mmHg (sbps 130-150s mmHg)  3. ASA 81 mg daily- aspirin may have inherent anti-inflammatory effects in preventing aneurysm progression/rupture as well as preventing clot formation at the base of the coil  4. MRA in 7 days (11/9/18)  5.  [place] following

## 2018-11-06 NOTE — PROGRESS NOTES
[facility]  Progress Note    Krissy Huggins Patient Status:  Inpatient    9/3/1975 MRN [id number]   [facility] 6NE-A Attending [place] Nurse, [address] [facility] Day # 5 PCP No primary care provider on file.      CC: Medical managemen 11/06/2018    CO2 25.0 11/06/2018    GLU 91 11/06/2018    CA 7.6 11/06/2018    PTT 24.6 11/06/2018    INR 0.95 11/06/2018    PTP 13.1 11/06/2018    MG 1.7 11/06/2018    PHOS 3.3 11/05/2018       Imaging:      Transcranial ultrasound  =====  CONCLUSION:  Th 0.9 % 100 mL IVPB 1,000 mg Intravenous Q12H   morphINE sulfate (PF) 4 MG/ML injection 1 mg 1 mg Intravenous Q2H PRN   Or      morphINE sulfate (PF) 4 MG/ML injection 2 mg 2 mg Intravenous Q2H PRN   Labetalol HCl (TRANDATE) injection 10 mg 10 mg Intravenous after pain control, may need other BP meds added- SBP parameters 130-180 per neurology. 4.  on nimotop  3. Seizzure due to above- on [employer] med per neurology  4. Headache due to above and vasospasm- on pain meds, on nimotop.  Neuro following          Deni Llanos

## 2018-11-06 NOTE — SLP NOTE
SPEECH DAILY NOTE - INPATIENT    ASSESSMENT & PLAN   ASSESSMENT  Pt seen for dysphagia tx to assess tolerance with recommended diet, ensure proper utilization of aspiration precautions and provide pt/family education.    RN approved for SLP to proceed and aspiration precautions and swallow strategies independently over 1-2 session(s). Modified goal     Met   Goal #3 The patient will participate in a cognitive communication evaluation.   Progressing      FOLLOW UP  Follow Up Needed: Yes  SLP Follow-up Date: 1

## 2018-11-06 NOTE — PLAN OF CARE
POSTPARTUM    • Long Term Goal:Experiences normal postpartum course Completed    • Appropriate maternal -  bonding Completed        Patient in bed, AOx4, on bedrest, still with weakness but able to listen and understand teachings, postpartum incisio

## 2018-11-06 NOTE — PROGRESS NOTES
[facility]  Neurosurgery Progress Note    Fred Elena Patient Status:  Inpatient    9/3/1975 MRN [id number]   [facility] 6NE-A Attending Estefani Gonzalez, [address] [facility] Day # 5 PCP No primary care provider on file.      Subjecti than 20 ml/hr or ICP > 20  PT/OT/ST  Nimodipine, BP parameters, and AEDs per neuroCC/IR          HA Ansari  11/6/2018, 8:06 AM      Pt seen and examined with oa  Case discussed  Note reviewed  evd patent  Begin chal

## 2018-11-06 NOTE — PROGRESS NOTES
[facility]  Neurocritical Care       Subjective: Amie Mccarthy is a(n) 44year old female was 31 weeks pregnant who suffered a seizure and significant headache, was taken emergently for  for eclampsia.  She was found t TML, V1-3 intact  Motor: LUE 5/5 throughout  RUE 5/5 throughout  LLE 5/5 throughout  RLE 5/5 throughout  Sensory exam normal to fine touch  Gait stable      Diagnostics:   Ct Brain Or Head (20486)    Result Date: 11/3/2018  PROCEDURE:  CT BRAIN OR HEAD (70 reduction techniques were used. Dose information is transmitted to the [facility] [employer] [facility] Radiology) NRDR ([address]) which includes the Dose Index Registry.   PATIENT STATED HISTORY: (As transcribed by Technologist)  Patient pr aspect of the right lateral ventricle. There is mild prominence of the lateral ventricles, improved since 11/1/2018. 3. Scattered sulcal effacement is again noted likely secondary to cerebral edema.   There is crowding within the posterior fossa with parti with the neuro intensivist, Dr. Lui Bailey with accurate read back.   16 04 hr.    Dictated by: Keshia Gordon MD on 11/01/2018 at 16:01     Approved by: Keshia Gordon MD             Transcranial Arteries Complete  (cpt=93886)    Result Date: 11/4/2018  PROCEDURE: PATIENT STATED HISTORY: (As transcribed by Technologist)     FINDINGS:  The right-sided velocities in cm/s are as follows: ICA CERVICAL:  57 ICA TERMINUS:  43 MCA PROXIMAL:  45 MCA MID:  54 MCA DISTAL:  51 MCA/ICA RATIO:  0.9 JIMENEZ PROXIMAL:  39 JIMENEZ DISTAL: (cpt=71045)    Result Date: 11/1/2018  PROCEDURE:  XR CHEST AP PORTABLE  (CPT=71045)  TECHNIQUE:  AP chest radiograph was obtained. COMPARISON:  None.   INDICATIONS:  unknown gestation came in with seizure at home  PATIENT STATED HISTORY: (As transcribed Technologist)  Patient presents with known subarachnoid hemorrhage. Patient had emergency cesarian section today. Originally presented to emergency department today after seizure.     CONTRAST USED:  60cc of Omnipaque 350  FINDINGS:    No aneurysm or vasc MG 1.7 11/06/2018    PHOS 3.3 11/05/2018     Recent Labs   Lab  11/02/18   0418  11/03/18   0401  11/04/18   0500  11/05/18   0419  11/06/18   0502   RBC  3.03*  3.78*  3.80  4.09  4.15   HGB  7.0*  9.7*  9.8*  10.7*  10.8*   HCT  22.8*  30.2*  30.8*  3 Intravenous Q15 Min PRN   phenylephrine in NaCl (BRI-SYNEPHRINE) 50 mg/250 ml premix infusion SOLN 100-200 mcg/min Intravenous Continuous   0.9%  NaCl infusion  Intravenous Once   aspirin tab 325 mg 325 mg Oral Daily   Or      aspirin 300 MG rectal supposi Oligohydramnios, antepartum    31 weeks gestation of pregnancy    H/O:     Seizure ([facility] [address]    SAH (subarachnoid hemorrhage) ([facility])    IVH (intraventricular hemorrhage) ([facility] [address]      Plan:  Neuro:  - Neuro checks Q 2hr  - Pain Meds -  Morphine, Norco  - Giuseppe Amaya MD  Medical Director - Stroke and Neurocritical Care  [facility] - In affiliation with [facility]. Board Certified in Neurology, Vascular Neurology(Stroke), Neurocritical Care and Headache Medicine.

## 2018-11-07 ENCOUNTER — APPOINTMENT (OUTPATIENT)
Dept: ULTRASOUND IMAGING | Facility: HOSPITAL | Age: 43
End: 2018-11-07
Attending: Other
Payer: MEDICAID

## 2018-11-07 PROCEDURE — 99291 CRITICAL CARE FIRST HOUR: CPT | Performed by: OTHER

## 2018-11-07 PROCEDURE — 93886 INTRACRANIAL COMPLETE STUDY: CPT | Performed by: OTHER

## 2018-11-07 PROCEDURE — 99232 SBSQ HOSP IP/OBS MODERATE 35: CPT | Performed by: INTERNAL MEDICINE

## 2018-11-07 PROCEDURE — 99231 SBSQ HOSP IP/OBS SF/LOW 25: CPT | Performed by: NEUROLOGICAL SURGERY

## 2018-11-07 RX ORDER — POTASSIUM CHLORIDE 20 MEQ/1
40 TABLET, EXTENDED RELEASE ORAL ONCE
Status: COMPLETED | OUTPATIENT
Start: 2018-11-07 | End: 2018-11-07

## 2018-11-07 RX ORDER — LEVETIRACETAM 500 MG/1
1000 TABLET ORAL 2 TIMES DAILY
Status: DISCONTINUED | OUTPATIENT
Start: 2018-11-07 | End: 2018-11-18

## 2018-11-07 RX ORDER — DIAZEPAM 5 MG/1
5 TABLET ORAL EVERY 8 HOURS PRN
Status: DISCONTINUED | OUTPATIENT
Start: 2018-11-07 | End: 2018-11-16

## 2018-11-07 RX ORDER — DEXAMETHASONE 4 MG/1
4 TABLET ORAL 2 TIMES DAILY WITH MEALS
Status: DISCONTINUED | OUTPATIENT
Start: 2018-11-07 | End: 2018-11-14

## 2018-11-07 RX ORDER — HYDROCODONE BITARTRATE AND ACETAMINOPHEN 5; 325 MG/1; MG/1
1 TABLET ORAL EVERY 6 HOURS PRN
Status: DISCONTINUED | OUTPATIENT
Start: 2018-11-07 | End: 2018-11-09

## 2018-11-07 RX ORDER — METOPROLOL SUCCINATE 25 MG/1
25 TABLET, EXTENDED RELEASE ORAL
Status: DISCONTINUED | OUTPATIENT
Start: 2018-11-07 | End: 2018-11-08

## 2018-11-07 RX ORDER — MAGNESIUM OXIDE 400 MG (241.3 MG MAGNESIUM) TABLET
400 TABLET ONCE
Status: COMPLETED | OUTPATIENT
Start: 2018-11-07 | End: 2018-11-07

## 2018-11-07 NOTE — OCCUPATIONAL THERAPY NOTE
OCCUPATIONAL THERAPY TREATMENT NOTE - INPATIENT     Room Number: 4375/4205-E  Session: 2  Number of Visits to Meet Established Goals: 3    Presenting Problem: s/p coil embolization, EVD placement, and emergent     History related to current admiss Daily Activity Short Form  How much help from another person does the patient currently need…  -   Putting on and taking off regular lower body clothing?: A Little  -   Bathing (including washing, rinsing, drying)?: A Little  -   Toileting, which includes of depression  15-19 - Moderately severe risk of depression  20-27 - Severe risk of depression    This score dictates SW consult to be placed, this order has been placed.           OT Discharge Recommendations: Home       PLAN  OT Treatment Plan: Balance ac

## 2018-11-07 NOTE — PROGRESS NOTES
[facility]  Interventional Neuroradiology Progress Note    Krissy Huggins Patient Status:  Inpatient    9/3/1975 MRN [id number]   [facility] 6NE-A Attending [place] Nurse, [address] [facility] Day # 6 PCP No primary care provider on f mL Infiltration Once   • ceFAZolin  1 g Intravenous Q8H       Intake/Output Summary (Last 24 hours) at 11/7/2018 9226  Last data filed at 11/7/2018 0900  Gross per 24 hour   Intake 3776 ml   Output 5352 ml   Net -1576 ml     Patient Weight for the past 72 as preventing clot formation at the base of the coil  4. MRA in 7 days (11/9/18)  5.  [place] following, SAH protocol in place              - daily NIHSS              - Monitor I/O closely, aim for uvolemic balance,               - IVF NS @125 ml/hr

## 2018-11-07 NOTE — PROGRESS NOTES
[facility]  Progress Note    Hailey Espino Patient Status:  Inpatient    9/3/1975 MRN [id number]   [facility] 6NE-A Attending Sully Small, [address] [facility] Day # 6 PCP No primary care provider on file.      CC: Medical managemen  11/07/2018    K 3.8 11/07/2018     11/07/2018    CO2 24.0 11/07/2018    GLU 97 11/07/2018    CA 7.9 11/07/2018    PTT 24.5 11/07/2018    INR 0.96 11/07/2018    PTP 13.2 11/07/2018    MG 1.7 11/07/2018    PHOS 4.1 11/07/2018       Imaging: Nalbuphine HCl (NUBAIN) injection 2.5 mg 2.5 mg Intravenous Q4H PRN   Carboprost Tromethamine (HEMABATE) injection 250 mcg 250 mcg Intramuscular Once   morphINE sulfate (PF) 4 MG/ML injection 1 mg 1 mg Intravenous Q2H PRN   Or      morphINE sulfate (PF) And with pain starts BP starts going up again to 161/95,  BP still elevated after pain control, may need other BP meds added, this was d/w t and , will add lose dose betablocker for HTN and tachycardia- SBP parameters 130-180 per neurology.   4.  on

## 2018-11-07 NOTE — PLAN OF CARE
Impaired Functional Mobility    • Achieve highest/safest level of mobility/gait Progressing        NEUROLOGICAL - ADULT    • Achieves stable or improved neurological status Progressing        Patient/Family Goals    • Patient/Family Short Term Goal Progres

## 2018-11-07 NOTE — SLP NOTE
Attempted to see pt this afternoon, however pt occupied with MD.  SLP will reattempt later today as schedule permits. Obey Wilde M.S.,CCC-SLP  Speech Language Pathologist

## 2018-11-07 NOTE — PROGRESS NOTES
[facility]  Neurosurgery Progress Note    Latosha Gibson Patient Status:  Inpatient    9/3/1975 MRN [id number]   [facility] 6NE-A Attending Sayda Harry, [address] [facility] Day # 6 PCP No primary care provider on file.      Subjecti more than 20 ml output  Call if consistently more than 20 ml/hr or ICP > 20  PT/OT/ST  Nimodipine, BP parameters, and AEDs per neuroCC/IR   Discussed with AH Garcia  [facility]  11/7/2018, 9:01 AM      Pt se

## 2018-11-07 NOTE — PROGRESS NOTES
[facility]  Neurocritical Care       Subjective: Cuca Kruger is a(n) 44year old female was 31 weeks pregnant who suffered a seizure and significant headache, was taken emergently for  for eclampsia.  She was found t Oriented to person, place, and date, Improved speech output  CN: EOMI, GUY, FS, TML, V1-3 intact  Motor: LUE 5/5 throughout  RUE 5/5 throughout  LLE 5/5 throughout  RLE 5/5 throughout  Sensory exam normal to fine touch  Gait stable      Diagnostics:   Ct TECHNIQUE:  Noncontrast CT scanning is performed through the brain. Dose reduction techniques were used.  Dose information is transmitted to the ACR [facility]) NRDR ([address]) which includes the Dose Index Regist ventriculostomy catheter which terminates within the posterior medial aspect of the right lateral ventricle. There is mild prominence of the lateral ventricles, improved since 11/1/2018.  3. Scattered sulcal effacement is again noted likely secondary to ce and there is sulcal effacement. Critical value test result discussed with the neuro intensivist, Dr. Leah Espinoza with accurate read back.   16 04 hr.    Dictated by: Elsi Thompson MD on 11/01/2018 at 16:01     Approved by: Elsi Thompson MD             Yg Holguin intracranial arteries was performed utilizing spectral waveform analysis.   PATIENT STATED HISTORY: (As transcribed by Technologist)     FINDINGS:  The right-sided velocities in cm/s are as follows: ICA CERVICAL:  57 ICA TERMINUS:  43 MCA PROXIMAL:  45 MCA at 7:51     Approved by: Shweta Lorenzo MD            Xr Chest Ap Portable  (cpt=71045)    Result Date: 11/1/2018  PROCEDURE:  XR CHEST AP PORTABLE  (CPT=71045)  TECHNIQUE:  AP chest radiograph was obtained. COMPARISON:  None.   INDICATIONS:  unknown ges includes the Dose Index Registry. PATIENT STATED HISTORY:(As transcribed by Technologist)  Patient presents with known subarachnoid hemorrhage. Patient had emergency cesarian section today.   Originally presented to emergency department today after seizur 11/07/2018    CA 7.9 11/07/2018    PTT 24.5 11/07/2018    INR 0.96 11/07/2018    MG 1.7 11/07/2018    PHOS 4.1 11/07/2018     Recent Labs   Lab  11/03/18   0401   11/05/18   0419  11/06/18   0502  11/07/18   0542   RBC  3.78*   < >  4.09  4.15  4.32   HGB (NIMOTOP) cap 60 mg 60 mg Oral 6 times per day   Nalbuphine HCl (NUBAIN) injection 2.5 mg 2.5 mg Intravenous Q15 Min PRN   phenylephrine in NaCl (BRI-SYNEPHRINE) 50 mg/250 ml premix infusion SOLN 100-200 mcg/min Intravenous Continuous   0.9%  NaCl infusion pregnancy    IUGR (intrauterine growth restriction) in prior pregnancy, pregnant    Eclampsia    Oligohydramnios, antepartum    31 weeks gestation of pregnancy    H/O:     Seizure ([address]    SAH (subarachnoid hemorrhage) (HCC)    IVH (intraventricul the patient, family, and clinical staff. Thank you for allowing me to participate in the care of this patient.      Giuseppe Amaya MD  Medical Director - Stroke and Neurocritical Care  [facility] - In affiliation with [facility]

## 2018-11-08 ENCOUNTER — APPOINTMENT (OUTPATIENT)
Dept: ULTRASOUND IMAGING | Facility: HOSPITAL | Age: 43
End: 2018-11-08
Attending: Other
Payer: MEDICAID

## 2018-11-08 ENCOUNTER — APPOINTMENT (OUTPATIENT)
Dept: MRI IMAGING | Facility: HOSPITAL | Age: 43
End: 2018-11-08
Attending: NURSE PRACTITIONER
Payer: MEDICAID

## 2018-11-08 PROCEDURE — 99231 SBSQ HOSP IP/OBS SF/LOW 25: CPT | Performed by: NEUROLOGICAL SURGERY

## 2018-11-08 PROCEDURE — 93886 INTRACRANIAL COMPLETE STUDY: CPT | Performed by: OTHER

## 2018-11-08 PROCEDURE — 99291 CRITICAL CARE FIRST HOUR: CPT | Performed by: OTHER

## 2018-11-08 PROCEDURE — 99232 SBSQ HOSP IP/OBS MODERATE 35: CPT | Performed by: INTERNAL MEDICINE

## 2018-11-08 RX ORDER — METOPROLOL SUCCINATE 25 MG/1
25 TABLET, EXTENDED RELEASE ORAL ONCE
Status: COMPLETED | OUTPATIENT
Start: 2018-11-08 | End: 2018-11-08

## 2018-11-08 RX ORDER — METOPROLOL SUCCINATE 50 MG/1
50 TABLET, EXTENDED RELEASE ORAL
Status: DISCONTINUED | OUTPATIENT
Start: 2018-11-09 | End: 2018-11-09

## 2018-11-08 RX ORDER — HEPARIN SODIUM 5000 [USP'U]/ML
5000 INJECTION, SOLUTION INTRAVENOUS; SUBCUTANEOUS EVERY 12 HOURS SCHEDULED
Status: DISCONTINUED | OUTPATIENT
Start: 2018-11-08 | End: 2018-11-14

## 2018-11-08 RX ORDER — MAGNESIUM OXIDE 400 MG (241.3 MG MAGNESIUM) TABLET
400 TABLET ONCE
Status: COMPLETED | OUTPATIENT
Start: 2018-11-08 | End: 2018-11-08

## 2018-11-08 RX ORDER — SODIUM CHLORIDE 1000 MG
1 TABLET, SOLUBLE MISCELLANEOUS
Status: DISCONTINUED | OUTPATIENT
Start: 2018-11-08 | End: 2018-11-15

## 2018-11-08 NOTE — PROGRESS NOTES
[facility]  Neurosurgery Progress Note    Jason Hackett Patient Status:  Inpatient    9/3/1975 MRN [id number]   [facility] 6NE-A Attending Mira Ling, [address] [facility] Day # 7 PCP No primary care provider on file.      Subjecti parameters, and AEDs per neuroCC/IR   Discussed with Dr. Smita Summers and Valium for spasms      HA Liriano [facility]  11/8/2018, 8:11 AM      Patient seen and examined with PA  Case discussed  Tolerating EVD at 21  Doin

## 2018-11-08 NOTE — LACTATION NOTE
LACTATION NOTE - MOTHER      Evaluation Type: Inpatient    Problems identified  Problems identified: Knowledge deficit;Milk supply not WNL; Recent antibiotic use  Milk supply not WNL: Reduced (potential); Delayed lactogenesis II;Hypogalactia  Problems Identi

## 2018-11-08 NOTE — PROGRESS NOTES
[facility]  Interventional Neuroradiology Progress Note    [facility] Patient Status:  Inpatient    9/3/1975 MRN [id number]   [facility] 6NE-A Attending Meagan Perez, [address] [facility] Day # 7 PCP No primary care provider on f Metoprolol Succinate ER  50 mg Oral Daily Beta Blocker   • Heparin Sodium (Porcine)  5,000 Units Subcutaneous 2 times per day   • levETIRAcetam  1,000 mg Oral BID   • dexamethasone  4 mg Oral BID with meals   • lidocaine  1 patch Transdermal Q24H   • nimod distribution is concerning for developing vasospasm. Assessment:  1. SAH, ZN8,YP4, pbd #7  2. 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 embolization with interval parent vessel occlusion ppd #6    Plan:  1.  Neuro

## 2018-11-08 NOTE — PROGRESS NOTES
DAQUAN HATHAWAY [facility]  Neurocritical Care       Subjective: Ginny Arteaga is a(n) 44year old female was 31 weeks pregnant who suffered a seizure and significant headache, was taken emergently for  for eclampsia.  She was found t -1500.4 ml       HEENT -NC/AT   Lungs - Clear b/l  Heart S1,S2    NEUROLOGICAL EXAMINATION:    Alert, Oriented to person, place, and date,Speech normal  CN: EOMI, GUY, FS, TML, V1-3 intact  Motor: LUE 5/5 throughout  RUE 5/5 throughout  LLE 5/5 throughout COMPARISON:  KAROL , CTA BRAIN (KIC=[id number]), 11/01/2018, 17:32. INDICATIONS:  s/p aneurysm coiling  TECHNIQUE:  Noncontrast CT scanning is performed through the brain. Dose reduction techniques were used.  Dose information is transmitted to the ACR ([place] not significantly changed since 11/1/2018. 2. Postoperative changes from a right parietal approach ventriculostomy catheter which terminates within the posterior medial aspect of the right lateral ventricle.   There is mild prominence of the lateral ventric hydrocephalus. 4.  Likely a degree of generalized edema as the gray-white matter interface is indistinct and there is sulcal effacement. Critical value test result discussed with the neuro intensivist, Dr. Ana Latham with accurate read back.   16 04 hr.    Dic 4:43.  INDICATIONS:  Subarachnoid Hemorrhage  TECHNIQUE:  Transcranial Doppler ultrasound of the intracranial arteries was performed utilizing spectral waveform analysis.   PATIENT STATED HISTORY: (As transcribed by Technologist)     FINDINGS:  The right-si CONCLUSION:  No evidence for vasospasm.     Dictated by: Nash Heath MD on 11/02/2018 at 7:51     Approved by: Nash Heath MD            Xr Chest Ap Portable  (cpt=71045)    Result Date: 11/1/2018  PROCEDURE:  XR CHEST AP PORTABLE  (CPT=71045 transmitted to the [facility] of [facility]) [address]) which includes the Dose Index Registry. PATIENT STATED HISTORY:(As transcribed by Technologist)  Patient presents with known subarachnoid hemorrhage.   Patient h 11/08/2018     11/08/2018    K 4.1 11/08/2018    K 4.1 11/08/2018     11/08/2018    CO2 23.0 11/08/2018     11/08/2018    CA 8.0 11/08/2018    PTT 24.6 11/08/2018    INR 0.92 11/08/2018    MG 1.8 11/08/2018    PHOS 3.7 11/08/2018     Rec patch 1 patch Transdermal Q24H   Butalbital-APAP-Caffeine (FIORICET, ESGIC) per tab 1 tablet 1 tablet Oral Q8H PRN   0.9%  NaCl infusion  Intravenous Continuous   nimodipine (NIMOTOP) cap 60 mg 60 mg Oral 6 times per day   Nalbuphine HCl (NUBAIN) injection minibag/add-van 1 g Intravenous Q8H       Assesment/Plan:   Active Problems:    Normal pregnancy    IUGR (intrauterine growth restriction) in prior pregnancy, pregnant    Eclampsia    Oligohydramnios, antepartum    31 weeks gestation of pregnancy    H/O: C was administered to manage and/or prevent neurologic instability. This involved direct patient intervention, complex decision making, and/or extensive discussions with the patient, family, and clinical staff.     Thank you for allowing me to participate in

## 2018-11-08 NOTE — PROGRESS NOTES
[facility]  Progress Note    Olga Remedies Patient Status:  Inpatient    9/3/1975 MRN [id number]   [facility] 6NE-A Attending Noris Alcala, [address] [facility] Day # 7 PCP No primary care provider on file.      CC: Medical managemen 4.1 11/08/2018    K 4.1 11/08/2018     11/08/2018    CO2 23.0 11/08/2018     11/08/2018    CA 8.0 11/08/2018    PTT 24.6 11/08/2018    INR 0.92 11/08/2018    PTP 12.7 11/08/2018    MG 1.8 11/08/2018    PHOS 3.7 11/08/2018       Imaging:      T Daily   Or      aspirin 300 MG rectal suppository 300 mg 300 mg Rectal Daily   Naloxone HCl (NARCAN) 0.4 MG/ML injection 0.08 mg 0.08 mg Intravenous Q5 Min PRN   DiphenhydrAMINE HCl (BENADRYL) injection 12.5 mg 12.5 mg Intravenous Q4H PRN   Nalbuphine HCl Seizure prevention medications per neurology  2. Eclampsia s/p emergent c section at 31 weeks by OBgyn  1.  managed by OBGYn. 2. BP improving since emergent c section on 11/1/2018 by OBGYN  3.  BP more controlled when pain/headache controlled.  And with pa

## 2018-11-08 NOTE — SLP NOTE
SPEECH/LANGUAGE/COGNITIVE EVALUATION - INPATIENT    Admission Date: 11/1/2018  Evaluation Date: 11/08/18    Reason for Referral: Stroke protocol    ASSESSMENT & PLAN   ASSESSMENT & IMPRESSION  Patient seen for quick initial cognitive communication evaluati Deficits Identified: (None); At this time, patient exhibited functional cognitive communication skills. Overall affect improving and patient appeared motivated to participate as long as pain can be managed.  No skilled SLP needs identified at this doug

## 2018-11-08 NOTE — CM/SW NOTE
SW met with pt,  Sherry Yu) and niece, Michael Gilliland. Pt presents with flat affect and mood. She identified feeling overwhelmed with her current admission and baby being in the NICU. SW provided support and encouragement.     SW reviewed coping skills and enc

## 2018-11-08 NOTE — PLAN OF CARE
Assumed pt care at 0730. Pt drowsy but easily aroused. Neuros Q2H days/ Q3H nights. See flowsheet for full documentation. EVD intact at 15 cm of h20. VSS. NSR/ ST. SBP maintained 130-180.  Pt c/o moderate to severe neck pain/ abdominal pain with some relief

## 2018-11-09 ENCOUNTER — APPOINTMENT (OUTPATIENT)
Dept: MRI IMAGING | Facility: HOSPITAL | Age: 43
End: 2018-11-09
Attending: NURSE PRACTITIONER
Payer: MEDICAID

## 2018-11-09 ENCOUNTER — APPOINTMENT (OUTPATIENT)
Dept: ULTRASOUND IMAGING | Facility: HOSPITAL | Age: 43
End: 2018-11-09
Attending: Other
Payer: MEDICAID

## 2018-11-09 PROCEDURE — 99232 SBSQ HOSP IP/OBS MODERATE 35: CPT | Performed by: INTERNAL MEDICINE

## 2018-11-09 PROCEDURE — 70544 MR ANGIOGRAPHY HEAD W/O DYE: CPT | Performed by: NURSE PRACTITIONER

## 2018-11-09 PROCEDURE — 93886 INTRACRANIAL COMPLETE STUDY: CPT | Performed by: OTHER

## 2018-11-09 PROCEDURE — 99233 SBSQ HOSP IP/OBS HIGH 50: CPT | Performed by: NEUROLOGICAL SURGERY

## 2018-11-09 PROCEDURE — 99291 CRITICAL CARE FIRST HOUR: CPT | Performed by: OTHER

## 2018-11-09 RX ORDER — FAMOTIDINE 20 MG/1
20 TABLET ORAL 2 TIMES DAILY
Status: DISCONTINUED | OUTPATIENT
Start: 2018-11-09 | End: 2018-11-20

## 2018-11-09 RX ORDER — HYDROCODONE BITARTRATE AND ACETAMINOPHEN 5; 325 MG/1; MG/1
2 TABLET ORAL EVERY 6 HOURS PRN
Status: DISCONTINUED | OUTPATIENT
Start: 2018-11-09 | End: 2018-11-10

## 2018-11-09 RX ORDER — POTASSIUM CHLORIDE 20 MEQ/1
40 TABLET, EXTENDED RELEASE ORAL ONCE
Status: COMPLETED | OUTPATIENT
Start: 2018-11-09 | End: 2018-11-09

## 2018-11-09 RX ORDER — METOPROLOL SUCCINATE 50 MG/1
50 TABLET, EXTENDED RELEASE ORAL
Status: DISCONTINUED | OUTPATIENT
Start: 2018-11-09 | End: 2018-11-12

## 2018-11-09 RX ORDER — POTASSIUM CHLORIDE 20 MEQ/1
40 TABLET, EXTENDED RELEASE ORAL ONCE
Status: DISCONTINUED | OUTPATIENT
Start: 2018-11-09 | End: 2018-11-09

## 2018-11-09 RX ORDER — HYDROCODONE BITARTRATE AND ACETAMINOPHEN 5; 325 MG/1; MG/1
1 TABLET ORAL EVERY 6 HOURS PRN
Status: DISCONTINUED | OUTPATIENT
Start: 2018-11-09 | End: 2018-11-10

## 2018-11-09 NOTE — PROGRESS NOTES
[facility]  Progress Note    Ming Arechiga Patient Status:  Inpatient    9/3/1975 MRN [id number]   [facility] 6NE-A Attending Robb Solis, [address] [facility] Day # 8 PCP No primary care provider on file.      CC: Medical managemen 3.8 11/09/2018     11/09/2018    CO2 25.0 11/09/2018    GLU 85 11/09/2018    CA 8.1 11/09/2018    MG 1.9 11/09/2018       Imaging:      Transcranial ultrasound  =====  CONCLUSION:  There is significant increase in velocity involving the left vertebra Oral Daily   Or      aspirin 300 MG rectal suppository 300 mg 300 mg Rectal Daily   Naloxone HCl (NARCAN) 0.4 MG/ML injection 0.08 mg 0.08 mg Intravenous Q5 Min PRN   DiphenhydrAMINE HCl (BENADRYL) injection 12.5 mg 12.5 mg Intravenous Q4H PRN   Nalbuphine vasospasm per neurology. Seizure prevention medications per neurology  5. EVD clamped by neurosurgeon today  2. Eclampsia s/p emergent c section at 31 weeks by OBgyn  1.  managed by OBGYn.   2. BP improving since emergent c section on 11/1/2018 by OBGYN  3

## 2018-11-09 NOTE — PROGRESS NOTES
[facility]  Neurosurgery Progress Note    Bill Mckee Patient Status:  Inpatient    9/3/1975 MRN [id number]   [facility] 6NE-A Attending Josh Maloney, [address] [facility] Day # 8 PCP No primary care provider on file.      Subjecti left side, proximal  centimeters/second, with continued normal appearance of the MCA/ICA ratio, 2.4. Continued clinical and ultrasound followup advised to exclude developing vasospasm. Assessment:   This is a 44year old patient with eclampsia

## 2018-11-09 NOTE — OCCUPATIONAL THERAPY NOTE
Attempted to see pt for session; however pt being taken off the floor for testing. Will try again later as time permits.

## 2018-11-09 NOTE — PROGRESS NOTES
[address] [place] [address] with [facility]  2018    9:38 AM      45 yo who came in  with elevated BP and severe headache, went for ER  to deliver the baby and CT showed SAH from L Pica dissected aneu pupils ER EOM full face symmetric tongue midline  No arm drift  Moves both legs  Neck cannot flex because of severe pain    Recent Labs   Lab  11/06/18   0502  11/07/18   0542  11/08/18   0459  11/09/18   0521   RBC  4.15  4.32  4.38  4.29   HGB  10.8*  10

## 2018-11-09 NOTE — PROGRESS NOTES
[facility]  Interventional Neuroradiology Progress Note    Ming Arechiga Patient Status:  Inpatient    9/3/1975 MRN [id number]   [facility] 6NE-A Attending Robb Solis, [address] [facility] Day # 8 PCP No primary care provider on f • Carboprost Tromethamine  250 mcg Intramuscular Once   • Senna  17.2 mg Oral Nightly   • docusate sodium  100 mg Oral BID   • Lidocaine HCl (PF)  20 mL Infiltration Once   • ceFAZolin  1 g Intravenous Q8H         Imaging:  TCDs 11/9/18:  R MCA/ICA RATIO following              - managing EVD, likely clamp today      Nessa Dorado, Brandee [facility]  11/9/2018, 9:26 AM  Spectre [id number]

## 2018-11-10 ENCOUNTER — APPOINTMENT (OUTPATIENT)
Dept: ULTRASOUND IMAGING | Facility: HOSPITAL | Age: 43
End: 2018-11-10
Attending: Other
Payer: MEDICAID

## 2018-11-10 ENCOUNTER — APPOINTMENT (OUTPATIENT)
Dept: CT IMAGING | Facility: HOSPITAL | Age: 43
End: 2018-11-10
Attending: NURSE PRACTITIONER
Payer: MEDICAID

## 2018-11-10 PROCEDURE — 93886 INTRACRANIAL COMPLETE STUDY: CPT | Performed by: OTHER

## 2018-11-10 PROCEDURE — 99233 SBSQ HOSP IP/OBS HIGH 50: CPT | Performed by: OTHER

## 2018-11-10 PROCEDURE — 99232 SBSQ HOSP IP/OBS MODERATE 35: CPT | Performed by: INTERNAL MEDICINE

## 2018-11-10 PROCEDURE — 70450 CT HEAD/BRAIN W/O DYE: CPT | Performed by: NURSE PRACTITIONER

## 2018-11-10 PROCEDURE — 99233 SBSQ HOSP IP/OBS HIGH 50: CPT | Performed by: NEUROLOGICAL SURGERY

## 2018-11-10 RX ORDER — LIDOCAINE 50 MG/G
3 PATCH TOPICAL EVERY 24 HOURS
Status: DISCONTINUED | OUTPATIENT
Start: 2018-11-10 | End: 2018-11-20

## 2018-11-10 RX ORDER — POTASSIUM CHLORIDE 20 MEQ/1
40 TABLET, EXTENDED RELEASE ORAL ONCE
Status: COMPLETED | OUTPATIENT
Start: 2018-11-10 | End: 2018-11-10

## 2018-11-10 RX ORDER — MORPHINE SULFATE 4 MG/ML
1 INJECTION, SOLUTION INTRAMUSCULAR; INTRAVENOUS ONCE
Status: COMPLETED | OUTPATIENT
Start: 2018-11-10 | End: 2018-11-10

## 2018-11-10 RX ORDER — HYDROCODONE BITARTRATE AND ACETAMINOPHEN 5; 325 MG/1; MG/1
2 TABLET ORAL EVERY 4 HOURS PRN
Status: DISCONTINUED | OUTPATIENT
Start: 2018-11-10 | End: 2018-11-17

## 2018-11-10 RX ORDER — HYDROCODONE BITARTRATE AND ACETAMINOPHEN 5; 325 MG/1; MG/1
1 TABLET ORAL EVERY 4 HOURS PRN
Status: DISCONTINUED | OUTPATIENT
Start: 2018-11-10 | End: 2018-11-20

## 2018-11-10 NOTE — PROGRESS NOTES
[facility]  Progress Note    Chittenden Scarborough Patient Status:  Inpatient    9/3/1975 MRN [id number]   [facility] 6NE-A Attending Apurva Batista, [address] [facility] Day # 9 PCP No primary care provider on file.      CC: Medical managemen 11/10/2018    K 3.8 11/10/2018     11/10/2018    CO2 25.0 11/10/2018    GLU 97 11/10/2018    CA 8.2 11/10/2018    MG 2.0 11/10/2018       Imaging:      Transcranial ultrasound  =====  CONCLUSION:  There is significant increase in velocity involving t aspirin tab 325 mg 325 mg Oral Daily   Or      aspirin 300 MG rectal suppository 300 mg 300 mg Rectal Daily   Naloxone HCl (NARCAN) 0.4 MG/ML injection 0.08 mg 0.08 mg Intravenous Q5 Min PRN   DiphenhydrAMINE HCl (BENADRYL) injection 12.5 mg 12.5 mg Intr monitor for cerebral vasospasm per neurology. Seizure prevention medications per neurology  5.  EVD open to drain at 20 cm of water for elevated intracranial pressure by neurosurgeon  2. Eclampsia s/p emergent c section at 31 weeks by OBgyn  1.  managed by

## 2018-11-10 NOTE — PLAN OF CARE
Problem: NEUROLOGICAL - ADULT  Goal: Achieves stable or improved neurological status  INTERVENTIONS  - Assess for and report changes in neurological status  - Initiate measures to prevent increased intracranial pressure  - Maintain blood pressure and fluid intermittently emotional, support provided. Massage therapist from [address] [place] [address] visits pt today and massages neck and shoulders with ok from neurosurg. Neurologically completely intact. Eating and drinking well. Working on breast pumping more.  Pt and h

## 2018-11-10 NOTE — PROGRESS NOTES
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 SAH, HH

## 2018-11-10 NOTE — PLAN OF CARE
Pt c/o neck pain, 6-9/10 headache, low back pain, and incisional pain. Medicated as per order. Pt cries c/o increased pain at times. Emotional support given. ICP increases with pain but doesn't say high consistently.  Dr. Selam Heredia and Dr. Brian Montgomery notified of M

## 2018-11-10 NOTE — PROGRESS NOTES
[facility]  Interventional Neuroradiology Progress Note    Nataleealcira Isabelman Patient Status:  Inpatient    9/3/1975 MRN [id number]   [facility] 6NE-A Attending Damián Chavez, 160[address] [facility] Day # 9 PCP No primary care provider on f levETIRAcetam  1,000 mg Oral BID   • dexamethasone  4 mg Oral BID with meals   • lidocaine  1 patch Transdermal Q24H   • nimodipine  60 mg Oral 6 times per day   • sodium chloride   Intravenous Once   • aspirin  325 mg Oral Daily    Or   • aspirin  300 mg aneurysm s/p coil embolization with interval parent vessel occlusion ppd #8    Plan:  1. Neuro q 2 hour  2. Sbp  130-180 mmHg (sbps 140-160s mmHg)  3.  ASA 81 mg daily- aspirin may have inherent anti-inflammatory effects in preventing aneurysm progression/r

## 2018-11-10 NOTE — PROGRESS NOTES
[facility]  Neurosurgery Progress Note    Gabino Gregorio Patient Status:  Inpatient    9/3/1975 MRN [id number]   [facility] 6NE-A Attending Armani Medeiros, 160[address] [facility] Day # 9 PCP No primary care provider on file.      Subjecti intraventricular hemorrhage within the left occipital horn, significantly improved since 11/3/2018. Mild prominence of the lateral ventricles, unchanged since 11/3/2018.   Persistent but improved subarachnoid   hemorrhage is seen within the pre-pontine cis Open drain at 20, monitor for vasospasm (elevated MCA velocities on TCD), likely re-challenge EVD in a few days.      Sadiq Gonzalez MD  Neurological Surgeon  [facility]  [address], [address], [address] [facility]

## 2018-11-11 ENCOUNTER — APPOINTMENT (OUTPATIENT)
Dept: ULTRASOUND IMAGING | Facility: HOSPITAL | Age: 43
End: 2018-11-11
Attending: Other
Payer: MEDICAID

## 2018-11-11 ENCOUNTER — APPOINTMENT (OUTPATIENT)
Dept: CT IMAGING | Facility: HOSPITAL | Age: 43
End: 2018-11-11
Attending: HOSPITALIST
Payer: MEDICAID

## 2018-11-11 PROCEDURE — 99232 SBSQ HOSP IP/OBS MODERATE 35: CPT | Performed by: INTERNAL MEDICINE

## 2018-11-11 PROCEDURE — 71275 CT ANGIOGRAPHY CHEST: CPT | Performed by: HOSPITALIST

## 2018-11-11 PROCEDURE — 99291 CRITICAL CARE FIRST HOUR: CPT | Performed by: OTHER

## 2018-11-11 PROCEDURE — 93886 INTRACRANIAL COMPLETE STUDY: CPT | Performed by: OTHER

## 2018-11-11 RX ORDER — POTASSIUM CHLORIDE 20 MEQ/1
40 TABLET, EXTENDED RELEASE ORAL ONCE
Status: COMPLETED | OUTPATIENT
Start: 2018-11-11 | End: 2018-11-11

## 2018-11-11 RX ORDER — MAGNESIUM OXIDE 400 MG (241.3 MG MAGNESIUM) TABLET
400 TABLET ONCE
Status: COMPLETED | OUTPATIENT
Start: 2018-11-11 | End: 2018-11-11

## 2018-11-11 NOTE — PROGRESS NOTES
[facility]  Neurosurgery Progress Note    Jason Hackett Patient Status:  Inpatient    9/3/1975 MRN [id number]   [facility] 6NE-A Attending Mira Ling, 160[address] [facility] Day # 10 PCP No primary care provider on file.      Subject HA Yuan  [employer]  11/11/2018, 7:50 AM      I have seen and examined this patient and agree with the findings documented above. Much more comfortable after meds given, neurologically intact.   Continue EVD at 21, plan for chall

## 2018-11-11 NOTE — PROGRESS NOTES
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 Neuro q 2 hour  2. Sbp  130-180 mmHg (sbps 120-150s mmHg)  3.  ASA 81 mg daily- aspirin may have inherent anti-inflammatory effects i

## 2018-11-11 NOTE — PROGRESS NOTES
[employer]  Neurocritical Care       Subjective: Lamin Vazquez is a(n) 44year old female was 31 weeks pregnant who suffered a seizure and significant headache, was taken emergently for  for eclampsia.  She was found t [address] filed at 11/11/2018 0800  Gross per 24 hour   Intake 3103 ml   Output 3271 ml   Net -168 ml       HEENT -NC/AT   Lungs - Clear b/l  Heart S1,S2    NEUROLOGICAL EXAMINATION:    Alert, Oriented to person, place, and date,Speech normal  CN: EO Brain Or Head ([id number])    Result Date: 11/2/2018  PROCEDURE:  CT BRAIN OR HEAD (37475)  COMPARISON:  KAROL CTA BRAIN (IUT=13805), 11/01/2018, 17:32. INDICATIONS:  s/p aneurysm coiling  TECHNIQUE:  Noncontrast CT scanning is performed through the brain. hemorrhage, however, the overall volume or extent of acute subarachnoid hemorrhage is not significantly changed since 11/1/2018.  2. Postoperative changes from a right parietal approach ventriculostomy catheter which terminates within the posterior medial a CONCLUSION:  1. Subarachnoid hemorrhage. 2.  Intraventricular hemorrhage. 3.  Mild hydrocephalus. 4.  Likely a degree of generalized edema as the gray-white matter interface is indistinct and there is sulcal effacement.   Critical value test result dis ARTERIES COMPLETE  (CPT=93886)  COMPARISON:  [facility] , CT BRAIN OR HEAD ([id number]), 11/03/2018, 4:43.   INDICATIONS:  Subarachnoid Hemorrhage  TECHNIQUE:  Transcranial Doppler ultrasound of the intracranial arteries was performed utilizing spectral waveform estrellita 61 PCA PROXIMAL:  32 PCA DISTAL:  24 VERTEBRAL:  37  BASILAR  ARTERY velocity in cm/s:  54      CONCLUSION:  No evidence for vasospasm.     Dictated by: Leda Trammell MD on 11/02/2018 at 7:51     Approved by: Leda Trammell MD            Xr Chest Ap P multiplanar MIP images were obtained. Dose reduction techniques were used. Dose information is transmitted to the [facility] [employer] of Radiology) [address] ([address]) which includes the Dose Index Registry.   PATIENT STATED HISTORY:(A 11/11/2018    HCT 32.4 11/11/2018    .0 11/11/2018    CREATSERUM 0.28 11/11/2018    BUN 8 11/11/2018     11/11/2018    K 3.8 11/11/2018     11/11/2018    CO2 25.0 11/11/2018    GLU 93 11/11/2018    CA 8.0 11/11/2018    MG 1.8 11/11/2018 Butalbital-APAP-Caffeine (FIORICET, ESGIC) per tab 1 tablet 1 tablet Oral Q8H PRN   0.9%  NaCl infusion  Intravenous Continuous   nimodipine (NIMOTOP) cap 60 mg 60 mg Oral 6 times per day   Nalbuphine HCl (NUBAIN) injection 2.5 mg 2.5 mg Intravenous Q15 Assesment/Plan:   Active Problems:    Normal pregnancy    IUGR (intrauterine growth restriction) in prior pregnancy, pregnant    Eclampsia    Oligohydramnios, antepartum    31 weeks gestation of pregnancy    H/O:     Seizure ([facility] [address]    1 Ac Zamarripa (wilkins manage and/or prevent neurologic instability. This involved direct patient intervention, complex decision making, and/or extensive discussions with the patient, family, and clinical staff.     Thank you for allowing me to participate in the care of this pat

## 2018-11-11 NOTE — PLAN OF CARE
Pt alert, oriented, Neuro intact. EVD in place open to drain  at 20 cm H2O. Drainage 1- 10 ml per hour. ICP mostly in low teens but occasionally read in 20's for few min and come down.  Pt was pain free with Norco and at 0615 this am pt c/o sudden headache

## 2018-11-11 NOTE — PROGRESS NOTES
[facility]  Progress Note    Jennifer Rising Patient Status:  Inpatient    9/3/1975 MRN [id number]   [facility] 6NE-A Attending Keri Min, 160[address] [facility] Day # 10 PCP No primary care provider on file.      CC: Medical manageme 11/11/2018     11/11/2018    CO2 25.0 11/11/2018    GLU 93 11/11/2018    CA 8.0 11/11/2018    MG 1.8 11/11/2018       Imaging:      Transcranial ultrasound  =====  CONCLUSION:  There is significant increase in velocity involving the left vertebral ar Oral Daily   Or      aspirin 300 MG rectal suppository 300 mg 300 mg Rectal Daily   Naloxone HCl (NARCAN) 0.4 MG/ML injection 0.08 mg 0.08 mg Intravenous Q5 Min PRN   DiphenhydrAMINE HCl (BENADRYL) injection 12.5 mg 12.5 mg Intravenous Q4H PRN   Nalbuphine vasospasm per neurology. Seizure prevention medications per neurology  5. EVD open to drain at 20 cm of water for elevated intracranial pressure by neurosurgeon  2. Eclampsia s/p emergent c section at 31 weeks by OBgyn  1.  managed by OBGYn.   2. BP improv

## 2018-11-12 ENCOUNTER — APPOINTMENT (OUTPATIENT)
Dept: ULTRASOUND IMAGING | Facility: HOSPITAL | Age: 43
End: 2018-11-12
Attending: Other
Payer: MEDICAID

## 2018-11-12 PROCEDURE — 99232 SBSQ HOSP IP/OBS MODERATE 35: CPT | Performed by: INTERNAL MEDICINE

## 2018-11-12 PROCEDURE — 99233 SBSQ HOSP IP/OBS HIGH 50: CPT | Performed by: OTHER

## 2018-11-12 PROCEDURE — 93886 INTRACRANIAL COMPLETE STUDY: CPT | Performed by: OTHER

## 2018-11-12 RX ORDER — MAGNESIUM OXIDE 400 MG (241.3 MG MAGNESIUM) TABLET
400 TABLET ONCE
Status: COMPLETED | OUTPATIENT
Start: 2018-11-12 | End: 2018-11-12

## 2018-11-12 RX ORDER — POTASSIUM CHLORIDE 20 MEQ/1
40 TABLET, EXTENDED RELEASE ORAL EVERY 4 HOURS
Status: COMPLETED | OUTPATIENT
Start: 2018-11-12 | End: 2018-11-12

## 2018-11-12 RX ORDER — METOPROLOL SUCCINATE 25 MG/1
25 TABLET, EXTENDED RELEASE ORAL
Status: DISCONTINUED | OUTPATIENT
Start: 2018-11-12 | End: 2018-11-16

## 2018-11-12 NOTE — PLAN OF CARE
Impaired Functional Mobility    • Achieve highest/safest level of mobility/gait Progressing        NEUROLOGICAL - ADULT    • Absence of seizures Progressing    • Remains free of injury related to seizure activity Progressing          Assumed patient care y

## 2018-11-12 NOTE — PROGRESS NOTES
Neurologically stable. Pain persists. PRNs given with good relief. Patient verbalized feeling pressure in her chest briefly. EKG obtained. Dr. Amee Treadwell notified. Order obtained for labs. Labs drawn and results called back to Dr. Amee Treadwell.   Instructed to

## 2018-11-12 NOTE — PROGRESS NOTES
[facility]  Progress Note    Javed Gallo Patient Status:  Inpatient    9/3/1975 MRN [id number]   [facility] 6NE-A Attending Kobe Barroso, 1604 [facility] Day # 11 PCP No primary care provider on file.      CC: Medical manageme CO2 25.0 11/12/2018    GLU 80 11/12/2018    CA 7.3 11/12/2018    DDIMER 1.67 11/11/2018    MG 1.7 11/12/2018    TROP <0.046 11/11/2018       Imaging:      Transcranial ultrasound  =====  CONCLUSION:  There is significant increase in velocity involving the tab 325 mg 325 mg Oral Daily   Or      aspirin 300 MG rectal suppository 300 mg 300 mg Rectal Daily   Naloxone HCl (NARCAN) 0.4 MG/ML injection 0.08 mg 0.08 mg Intravenous Q5 Min PRN   DiphenhydrAMINE HCl (BENADRYL) injection 12.5 mg 12.5 mg Intravenous Q4 cerebral vasospasm per neurology. Seizure prevention medications per neurology  5. EVD open to drain at 20 cm of water for elevated intracranial pressure by neurosurgeon  2. Eclampsia s/p emergent c section at 31 weeks by OBgyn  1.  managed by OBGYn.   2.

## 2018-11-12 NOTE — PROGRESS NOTES
[facility]  Neurosurgery Progress Note    Katharina Cook Patient Status:  Inpatient    9/3/1975 MRN [id number]   [facility] 6NE-A Attending Meg Davidson, [address] [facility] [address] Day # 11 PCP No primary care provider on file.      Subject consistently more than 20 ml/hr  PT/OT/ST  Nimodipine, BP parameters, and AEDs per neuroCC/IR   Headache management per neurology  Pt seen with Dr. Harper Rome on rounds      [address]  11/12/2018, 10:37 AM

## 2018-11-12 NOTE — OCCUPATIONAL THERAPY NOTE
Attempted to see pt for OT tx, but pt off the floor at this time. Will try again later if time permits.

## 2018-11-12 NOTE — PROGRESS NOTES
[facility]  Interventional Neuroradiology Progress Note    Magalis Vaughan Patient Status:  Inpatient    9/3/1975 MRN [id number]   [facility] 6NE-A Attending Veronica Perez, 1604 [facility] Day # 11 PCP No primary care provider on aspirin  300 mg Rectal Daily   • Carboprost Tromethamine  250 mcg Intramuscular Once   • Senna  17.2 mg Oral Nightly   • docusate sodium  100 mg Oral BID   • Lidocaine HCl (PF)  20 mL Infiltration Once   • ceFAZolin  1 g Intravenous Q8H         Imaging:  T clot formation at the base of the coil  4. 7 Day post coiling MRA completed 11/8/18  5.  Patrick following, SAH protocol in place              - daily NIHSS              - Monitor I/O closely, aim for uvolemic balance,               - IVF NS @100 ml/hr

## 2018-11-12 NOTE — PROGRESS NOTES
[employer]  Neurocritical Care       Subjective: Evelina Davidson is a(n) 44year old female was 31 weeks pregnant who suffered a seizure and significant headache, was taken emergently for  for eclampsia.  She was found t (36.9 °C)    Intake/Output:    Intake/Output Summary (Last 24 hours) at 11/12/2018 0923  Last data filed at 11/12/2018 0800  Gross per 24 hour   Intake 2918 ml   Output 5046 ml   Net -2128 ml       HEENT -NC/AT   Lungs - Clear b/l  Heart S1,S2    NEUROLOGI by: Abilio Pearson MD on 11/03/2018 at 7:12     Approved by: Abilio Pearson MD            Ct Brain Or Head ([id number])    Result Date: 11/2/2018  PROCEDURE:  CT BRAIN OR HEAD (20291)  COMPARISON:  LYUBOV ROBERSON BRAIN (TOV=94718), 11/01/2018, 17:32.   INDICATIONS:  s/p a redistributed within the occipital horns with mildly worse layering intraventricular hemorrhage, however, the overall volume or extent of acute subarachnoid hemorrhage is not significantly changed since 11/1/2018.  2. Postoperative changes from a right leslie the right posterior cranial fossa with maximal thickness measured at 1.2 cm. CONCLUSION:  1. Subarachnoid hemorrhage. 2.  Intraventricular hemorrhage. 3.  Mild hydrocephalus.  4.  Likely a degree of generalized edema as the gray-white matter interface Complete  (cpt=93886)    Result Date: 11/3/2018  PROCEDURE:  US TRANSCRANIAL ARTERIES COMPLETE  (CPT=93886)  COMPARISON:  [facility] , CT BRAIN OR HEAD (07866), 11/03/2018, 4:43.   INDICATIONS:  Subarachnoid Hemorrhage  TECHNIQUE:  Transcranial Doppler ultrasou MCA MID:  58 MCA DISTAL:  64 MCA/ICA RATIO:  1.1 JIMENEZ PROXIMAL:  53 JIMENEZ DISTAL:  59 PCA PROXIMAL:  32 PCA DISTAL:  24 VERTEBRAL:  37  BASILAR  ARTERY velocity in cm/s:  54      CONCLUSION:  No evidence for vasospasm.     Dictated by: Jimmie Bailey MD on 1 using non-ionic contrast. Multiplanar 3D reformatted imaging as well as multiplanar MIP images were obtained. Dose reduction techniques were used.  Dose information is transmitted to the [facility] ([address] [place] [address] of Radiology) [facility] ([address] [facility] Results   Component Value Date    WBC 7.3 11/12/2018    HGB 9.3 11/12/2018    HCT 29.2 11/12/2018    .0 11/12/2018    CREATSERUM 0.29 11/12/2018    BUN 9 11/12/2018     11/12/2018    K 3.6 11/12/2018    K 3.6 11/12/2018     11/12/2018 1,000 mg 1,000 mg Oral BID   dexamethasone (DECADRON) tab 4 mg 4 mg Oral BID with meals   Cyclobenzaprine HCl (FLEXERIL) tab 5 mg 5 mg Oral TID PRN   Normal Saline Flush 0.9 % injection 10 mL 10 mL Intravenous PRN   Butalbital-APAP-Caffeine (FIORICET, ESGI infusion 5-15 mg/hr Intravenous Continuous PRN   Lidocaine HCl (PF) (XYLOCAINE) 1 % injection SOLN 20 mL 20 mL Infiltration Once   ceFAZolin (ANCEF) IVPB 1g/100ml in 0.9% NaCl minibag/add-van 1 g Intravenous Q8H       Assesment/Plan:   Active Problems: gave them an update. G: No lines      Goals of the Day: Monitor for spasm or s/s of PICA stroke, headache management. Thank you for allowing me to participate in the care of this patient.      Babatunde Lagos MD  Medical Director - Stroke and Neuroc

## 2018-11-13 ENCOUNTER — APPOINTMENT (OUTPATIENT)
Dept: ULTRASOUND IMAGING | Facility: HOSPITAL | Age: 43
End: 2018-11-13
Attending: Other
Payer: MEDICAID

## 2018-11-13 PROCEDURE — 90792 PSYCH DIAG EVAL W/MED SRVCS: CPT | Performed by: OTHER

## 2018-11-13 PROCEDURE — 99232 SBSQ HOSP IP/OBS MODERATE 35: CPT | Performed by: INTERNAL MEDICINE

## 2018-11-13 PROCEDURE — 99231 SBSQ HOSP IP/OBS SF/LOW 25: CPT | Performed by: NURSE PRACTITIONER

## 2018-11-13 PROCEDURE — 99233 SBSQ HOSP IP/OBS HIGH 50: CPT | Performed by: OTHER

## 2018-11-13 PROCEDURE — 93886 INTRACRANIAL COMPLETE STUDY: CPT | Performed by: OTHER

## 2018-11-13 RX ORDER — LISINOPRIL 5 MG/1
5 TABLET ORAL DAILY
Status: DISCONTINUED | OUTPATIENT
Start: 2018-11-13 | End: 2018-11-16

## 2018-11-13 NOTE — PROGRESS NOTES
[facility]  Interventional Neuroradiology Progress Note    Colin Morning Patient Status:  Inpatient    9/3/1975 MRN [id number]   [facility] 6NE-A Attending Jc Candelaria, [address] [facility] Day # 12 PCP No primary care provider on mg Oral BID with meals   • nimodipine  60 mg Oral 6 times per day   • sodium chloride   Intravenous Once   • aspirin  325 mg Oral Daily    Or   • aspirin  300 mg Rectal Daily   • Carboprost Tromethamine  250 mcg Intramuscular Once   • Senna  17.2 mg Oral N q 2 hour  2. -180 (120s-160s mmHg)  3.  HWA 027 mg daily- aspirin may have inherent anti-inflammatory effects in preventing aneurysm progression/rupture as well as preventing clot formation at the base of the coil  4. 7 Day post coiling MRA completed

## 2018-11-13 NOTE — CONSULTS
[facility]  Report of Psychiatric Consultation    Radha Batista Patient Status:  Inpatient    9/3/1975 MRN [id number]   [facility] 6NE-A Attending Gonzales Henry, [address] [facility] Day # 12 PCP No primary care provider on file. They named him Speedy (which means \"The Keiry Curly, my strength\") and are ready to be parents to a new born again. Currently, she has mild anxiety and depressed mood, but feels \"much better\" now that the headache is mild.  She DENIES any hopelessness or suic injection 5,000 Units, 5,000 Units, Subcutaneous, 2 times per day  •  sodium chloride tab 1 g, 1 g, Oral, TID CC  •  diazepam (VALIUM) tab 5 mg, 5 mg, Oral, Q8H PRN  •  levETIRAcetam (KEPPRA) tab 1,000 mg, 1,000 mg, Oral, BID  •  dexamethasone (DECADRON) t injection 4 mg, 4 mg, Intravenous, Q6H PRN **OR** Metoclopramide HCl (REGLAN) injection 10 mg, 10 mg, Intravenous, Q8H PRN  •  LORazepam (ATIVAN) injection 1 mg, 1 mg, Intravenous, Q15 Min PRN  •  ondansetron HCl (ZOFRAN) injection 4 mg, 4 mg, Intravenous, 11/13/2018

## 2018-11-13 NOTE — BH PROGRESS NOTE
JUAN PPD SCREENING    Reason for Referral: This post-partum patient was seen for behavioral health assessment due to elevated EPDS at eleven days post delivery and while in ICU. Her  was present and contributed to the interview.   The couple and thei spouse. General Cognitive Functioning: At times impaired by severe headache per her report.    Thought Process: Linear   Thought Content: Focused on pain, concern for baby   Mood/Affect: Stable/congruent   Orientation: X 3   Speech: Normal pattern alter

## 2018-11-13 NOTE — PROGRESS NOTES
[facility]  Neurosurgery Progress Note    Amie Mccarthy Patient Status:  Inpatient    9/3/1975 MRN [id number]   [facility] 6NE-A Attending John Meyer, [address] Day # 12 PCP No primary care provider on file.      Subject =====  CONCLUSION:  Interval increase in the velocity of the right vertebral artery. This velocity measures 142 centimeters/second. The velocity on 11/12/2018 measured 32 cm/sec. This may represent spasm or stenosis.   Consider cross-sectional imaging

## 2018-11-13 NOTE — PLAN OF CARE
Assumed care at [address]. Pt. Visiting with family. Up to restroom frequently during night. Urine with occasional few small clots  EVD at 20cm H2O, 14cc serous drainage overnight. ICP readings 7-14. Neuro exams q3 hrs, pt. is neurologically intact.   Pt. Julissa Mckeon

## 2018-11-13 NOTE — PLAN OF CARE
Pt has been neurologically stable throughout the day, no deficits noted. Pt with waxing and waning degrees of headache/neck pain. Managing pain with norco tablets and ambulating halls. HA improves with activity.   EVD clamped by neurosurgery this am.  [facility]

## 2018-11-13 NOTE — PROGRESS NOTES
[employer] [facility]  Neurocritical Care       Subjective: Lamin Vazquez is a(n) 44year old female was 31 weeks pregnant who suffered a seizure and significant headache, was taken emergently for  for eclampsia.  She was found t °F (36.6 °C), Min:97.4 °F (36.3 °C), Max:98.4 °F (36.9 °C)    Intake/Output:    Intake/Output Summary (Last 24 hours) at 11/13/2018 0750  Last data filed at 11/13/2018 0700  Gross per 24 hour   Intake 3846 ml   Output 3768 ml   Net 78 ml       HEENT -NC/AT Continued followup is suggested.     Dictated by: Christopher Adams MD on 11/03/2018 at 7:12     Approved by: Christopher Adams MD            Ct Brain Or Head ([id number])    Result Date: 11/2/2018  PROCEDURE:  CT BRAIN OR HEAD (01177)  COMPARISON:  LYUBOV ROBERSON BRAIN (CPT=70 large amount of subarachnoid hemorrhage.   This is redistributed within the occipital horns with mildly worse layering intraventricular hemorrhage, however, the overall volume or extent of acute subarachnoid hemorrhage is not significantly changed since 11/ indistinct. There is a slender arachnoid cyst in the right posterior cranial fossa with maximal thickness measured at 1.2 cm. CONCLUSION:  1. Subarachnoid hemorrhage. 2.  Intraventricular hemorrhage. 3.  Mild hydrocephalus.  4.  Likely a degree of ge Adrián Puentes MD            Us Transcranial Arteries Complete  (cpt=93886)    Result Date: 11/3/2018  PROCEDURE:  US TRANSCRANIAL ARTERIES COMPLETE  (CPT=93886)  COMPARISON:  [facility] CT BRAIN OR HEAD (20894), 11/03/2018, 4:43.   INDICATIONS:  Subarachnoid Hemorrh CERVICAL:  61 ICA TERMINUS:  34 MCA PROXIMAL:  60 MCA MID:  58 MCA DISTAL:  64 MCA/ICA RATIO:  1.1 JIMENEZ PROXIMAL:  53 JIMENEZ DISTAL:  59 PCA PROXIMAL:  32 PCA DISTAL:  24 VERTEBRAL:  37  BASILAR  ARTERY velocity in cm/s:  54      CONCLUSION:  No evidence for v multislice CT angiography of the brain vasculature using non-ionic contrast. Multiplanar 3D reformatted imaging as well as multiplanar MIP images were obtained. Dose reduction techniques were used.  Dose information is transmitted to the [facility] [employer] Jerod Wade MD              Lab Review     Lab Results   Component Value Date    K 3.9 11/13/2018    MG 2.0 11/13/2018     Recent Labs   Lab  11/08/18   0459   11/10/18   0431  11/11/18   0519  11/12/18   0504   RBC  4.38   < >  4.20  3.88  3.53*   HGB per tab 1 tablet 1 tablet Oral Q8H PRN   0.9%  NaCl infusion  Intravenous Continuous   nimodipine (NIMOTOP) cap 60 mg 60 mg Oral 6 times per day   Nalbuphine HCl (NUBAIN) injection 2.5 mg 2.5 mg Intravenous Q15 Min PRN   phenylephrine in NaCl (BRI-SYNEPHRI pregnancy    IUGR (intrauterine growth restriction) in prior pregnancy, pregnant    Eclampsia    Oligohydramnios, antepartum    31 weeks gestation of pregnancy    H/O:     Seizure ([address]    SAH (subarachnoid hemorrhage) (HCC)    IVH (intraventricul [address] [facility] - In affiliation with [employer]. Board Certified in Neurology, Vascular Neurology(Stroke), Neurocritical Care and Headache Medicine.

## 2018-11-13 NOTE — PROGRESS NOTES
PSYCH CONSULT    Date of Admission: 11/1/18  Date of Consult: 11/13/18  Reason for Consultation: Eval anxiety, depression    Impression:  Primary Psychiatric Diagnoses:  Adjustment disorder with anxiety and depressed mood    Recent delirium from seizure an

## 2018-11-13 NOTE — PROGRESS NOTES
[facility]  Progress Note    [facility] Patient Status:  Inpatient    9/3/1975 MRN [id number]   [facility] 6NE-A Attending Radha Ford, [address] [facility] Day # 12 PCP No primary care provider on file.      CC: Medical manageme 11/13/2018    GLU 82 11/13/2018    CA 8.4 11/13/2018    ALB 2.5 11/13/2018    ALKPHO 100 11/13/2018    BILT 0.2 11/13/2018    TP 6.0 11/13/2018    AST 27 11/13/2018    ALT 39 11/13/2018    MG 2.0 11/13/2018       Imaging:      Transcranial ultrasound  ==== (BRI-SYNEPHRINE) 50 mg/250 ml premix infusion SOLN 100-200 mcg/min Intravenous Continuous   0.9%  NaCl infusion  Intravenous Once   aspirin tab 325 mg 325 mg Oral Daily   Or      aspirin 300 MG rectal suppository 300 mg 300 mg Rectal Daily   Naloxone HCl ( 11/2/2018  2. neurology and neurosurgery following. 3. Patient on Nimotop for vasospasm. 4.  Daily transcranial Dopplers to monitor for cerebral vasospasm per neurology. Seizure prevention medications per neurology  5.  EVD open to drain at 20 cm of w

## 2018-11-14 ENCOUNTER — APPOINTMENT (OUTPATIENT)
Dept: CT IMAGING | Facility: HOSPITAL | Age: 43
End: 2018-11-14
Attending: NURSE PRACTITIONER
Payer: MEDICAID

## 2018-11-14 ENCOUNTER — APPOINTMENT (OUTPATIENT)
Dept: ULTRASOUND IMAGING | Facility: HOSPITAL | Age: 43
End: 2018-11-14
Attending: Other
Payer: MEDICAID

## 2018-11-14 PROCEDURE — 93886 INTRACRANIAL COMPLETE STUDY: CPT | Performed by: OTHER

## 2018-11-14 PROCEDURE — 99233 SBSQ HOSP IP/OBS HIGH 50: CPT | Performed by: HOSPITALIST

## 2018-11-14 PROCEDURE — 99232 SBSQ HOSP IP/OBS MODERATE 35: CPT | Performed by: NURSE PRACTITIONER

## 2018-11-14 PROCEDURE — 70450 CT HEAD/BRAIN W/O DYE: CPT | Performed by: NURSE PRACTITIONER

## 2018-11-14 PROCEDURE — 99291 CRITICAL CARE FIRST HOUR: CPT | Performed by: OTHER

## 2018-11-14 RX ORDER — HEPARIN SODIUM 5000 [USP'U]/ML
5000 INJECTION, SOLUTION INTRAVENOUS; SUBCUTANEOUS EVERY 8 HOURS SCHEDULED
Status: DISCONTINUED | OUTPATIENT
Start: 2018-11-14 | End: 2018-11-18

## 2018-11-14 RX ORDER — POTASSIUM CHLORIDE 29.8 MG/ML
40 INJECTION INTRAVENOUS ONCE
Status: COMPLETED | OUTPATIENT
Start: 2018-11-14 | End: 2018-11-14

## 2018-11-14 RX ORDER — DEXAMETHASONE 2 MG/1
2 TABLET ORAL 2 TIMES DAILY WITH MEALS
Status: DISCONTINUED | OUTPATIENT
Start: 2018-11-14 | End: 2018-11-15

## 2018-11-14 RX ORDER — NIMODIPINE 30 MG/1
60 CAPSULE, LIQUID FILLED ORAL
Qty: 84 CAPSULE | Refills: 0 | Status: SHIPPED | OUTPATIENT
Start: 2018-11-16 | End: 2018-11-20

## 2018-11-14 NOTE — PROGRESS NOTES
[facility]  Progress Note     Cuca Kruger Patient Status:  Inpatient    9/3/1975 MRN [id number]   [facility] 6NE-A Attending Chula aLndry, [address] [facility] Day # 15 PCP No primary care provider on file.      Chief Complaint: data reviewed in Epic.     Medications:   • dexamethasone  2 mg Oral BID with meals   • Heparin Sodium (Porcine)  5,000 Units Subcutaneous Q8H [facility]   • lisinopril  5 mg Oral Daily   • Metoprolol Succinate ER  25 mg Oral 2x Daily(Beta Blocker)   • lidocaine

## 2018-11-14 NOTE — CM/SW NOTE
ALISHA advised by Mary Mckenzie from Pharmacy tht pt acnnot get coverage for Op meds at [facility] due to her [facility]. SW met with pt- [facility] [place]   [facility]armacist [facility] in [address] [place] [address] to see if they would be able to bill her [place]

## 2018-11-14 NOTE — PROGRESS NOTES
EVD removed. Tip of catheter present at time of removal.  No CSF leaking from site. Gauze bandage applied.   Pt tolerated well

## 2018-11-14 NOTE — PROGRESS NOTES
[facility]  Neuro Critical Care Progress Note    [facility] Patient Status:  Inpatient    9/3/1975 MRN [id number]   [facility] 6NE-A Attending Meagan Perez, [address] [facility] Day # 15 PCP No primary care provider on file. Oral, Q4H PRN **OR** HYDROcodone-acetaminophen (NORCO) 5-325 MG per tab 2 tablet, 2 tablet, Oral, Q4H PRN  •  famoTIDine (PEPCID) tab 20 mg, 20 mg, Oral, BID  •  sodium chloride tab 1 g, 1 g, Oral, TID CC  •  diazepam (VALIUM) tab 5 mg, 5 mg, Oral, Q8H PRN ondansetron HCl (ZOFRAN) injection 4 mg, 4 mg, Intravenous, Q6H PRN **OR** Metoclopramide HCl (REGLAN) injection 10 mg, 10 mg, Intravenous, Q8H PRN  •  LORazepam (ATIVAN) injection 1 mg, 1 mg, Intravenous, Q15 Min PRN  •  ondansetron HCl (ZOFRAN) injection more than 100.5°F.    Skin:  -Monitor for skin breakdown. Endocrine:  -Hyperglycemia protocol. A total of 35 minutes of critical care time (exclusive of billable procedures) was administered to manage and/or prevent neurologic instability.  This inv

## 2018-11-14 NOTE — OCCUPATIONAL THERAPY NOTE
OCCUPATIONAL THERAPY TREATMENT NOTE - INPATIENT     Room Number: 1930/6864-X  Session: 3  Number of Visits to Meet Established Goals: 3    Presenting Problem: s/p coil embolization, EVD placement, and emergent     History related to current admiss SATURATIONS                ACTIVITIES OF DAILY LIVING ASSESSMENT  AM-PAC ‘6-Clicks’ Inpatient Daily Activity Short Form  How much help from another person does the patient currently need…  -   Putting on and taking off regular lower body clothing?: None  - training; Endurance training;Patient/Family training;Patient/Family education; Compensatory technique education  Rehab Potential : Good         OT Goals:   ADL Goals   Patient will perform upper body dressing:  with supervision met  Patient will perform lowe

## 2018-11-14 NOTE — PROGRESS NOTES
[facility]  Neurosurgery Progress Note    Evelina Davidson Patient Status:  Inpatient    9/3/1975 MRN [id number]   [facility] 6NE-A Attending Aura White, [address] [facility] Day # 15 PCP No primary care provider on file.      Subject 11/14  FINDINGS:    The right-sided velocities in cm/s are as follows:  ICA CERVICAL:  50  ICA TERMINUS:  25  MCA PROXIMAL:  66  MCA MID:  139  MCA DISTAL:  126  MCA/ICA RATIO:  2.8  JIMENEZ PROXIMAL:  24  JIMENEZ DISTAL:  38  PCA PROXIMAL:  25  PCA DISTAL:  27  V

## 2018-11-14 NOTE — PROGRESS NOTES
[facility]  Interventional Neuroradiology Progress Note    Isaias Lao Patient Status:  Inpatient    9/3/1975 MRN [id number]   [facility] 6NE-A Attending Apurva Batista, [address] [facility] Day # 15 PCP No primary care provider on patch Transdermal Q24H   • famoTIDine  20 mg Oral BID   • sodium chloride  1 g Oral TID CC   • levETIRAcetam  1,000 mg Oral BID   • nimodipine  60 mg Oral 6 times per day   • sodium chloride   Intravenous Once   • aspirin  325 mg Oral Daily    Or   • aspir progression/rupture as well as preventing clot formation at the base of the coil  4. 7 Day post coiling MRA completed 11/8/18  5.  Patrick following, SAH protocol in place              - daily NIHSS              - Monitor I/O closely, aim for uvolemic balance,

## 2018-11-15 ENCOUNTER — APPOINTMENT (OUTPATIENT)
Dept: ULTRASOUND IMAGING | Facility: HOSPITAL | Age: 43
End: 2018-11-15
Attending: Other
Payer: MEDICAID

## 2018-11-15 PROCEDURE — 93886 INTRACRANIAL COMPLETE STUDY: CPT | Performed by: OTHER

## 2018-11-15 PROCEDURE — 99232 SBSQ HOSP IP/OBS MODERATE 35: CPT | Performed by: NURSE PRACTITIONER

## 2018-11-15 PROCEDURE — 99232 SBSQ HOSP IP/OBS MODERATE 35: CPT | Performed by: HOSPITALIST

## 2018-11-15 PROCEDURE — 99231 SBSQ HOSP IP/OBS SF/LOW 25: CPT | Performed by: OTHER

## 2018-11-15 RX ORDER — DEXAMETHASONE 2 MG/1
2 TABLET ORAL DAILY
Status: COMPLETED | OUTPATIENT
Start: 2018-11-16 | End: 2018-11-16

## 2018-11-15 RX ORDER — POTASSIUM CHLORIDE 29.8 MG/ML
40 INJECTION INTRAVENOUS ONCE
Status: COMPLETED | OUTPATIENT
Start: 2018-11-15 | End: 2018-11-15

## 2018-11-15 RX ORDER — LEVETIRACETAM 1000 MG/1
1000 TABLET ORAL 2 TIMES DAILY
Qty: 60 TABLET | Refills: 0 | Status: SHIPPED | OUTPATIENT
Start: 2018-11-15 | End: 2018-11-20

## 2018-11-15 NOTE — CM/SW NOTE
SW met with pt and , Praveen Salinas. Support and encouragement given as the couple shared feelings and thoughts on continued stay for pt and son in the NICU. Coping skills discussed and the importance of taking one day at a time encouraged.     SW discusse

## 2018-11-15 NOTE — CM/SW NOTE
SW spoke to pt's - pt sleeping. Discussed [facility] through [employer]. Plan is for 6 days of [place]otop at [place] (assuming pt goes home tomorrow). Discussed [facility] and [employer] $4.00 list for meds.    states that they tried to switch

## 2018-11-15 NOTE — PROGRESS NOTES
[facility]  Progress Note     Estuardo Gregory Patient Status:  Inpatient    9/3/1975 MRN [id number]   [facility] 6NE-A Attending Virgilio Macdonald, [address] [facility] Day # 14 PCP No primary care provider on file.      Chief Complaint: • [START ON 11/16/2018] dexamethasone  2 mg Oral Daily   • Heparin Sodium (Porcine)  5,000 Units Subcutaneous Q8H [address]   • lisinopril  5 mg Oral Daily   • Metoprolol Succinate ER  25 mg Oral 2x Daily(Beta Blocker)   • lidocaine  3 patch Transdermal Q24H

## 2018-11-15 NOTE — PLAN OF CARE
Assumed care of pt at 0730. Neuro signs intact. PT has persistent H/A that is relieved by Norco and ambulation in the halls. Pt feels better after walking around. Pt tolerated long walk to NICU to see her baby this afternoon. NELA pulled by Neurosurgery.  Pe

## 2018-11-15 NOTE — PLAN OF CARE
Care asumed @ 0730. Awakens easily to verbal stimuli, denies H/A, no neuro deficits. Scant amt of CSF leaking from EVD tract. Stephani Shoulder APN in with dermabond applied to tract opening. Dr Loetta Castleman in & released to d/c home when OK with Dr Gena Moreland.  Up to BR, in chair f

## 2018-11-15 NOTE — PLAN OF CARE
NURSING TRANSFER NOTE      Patient transferred via Wheelchair  Oriented to room. Safety precautions initiated. Bed in low position. Call light in reach. No neuro deficits noted  Pt c/o headache 8/10 d/t position changes.  Declined pain medication

## 2018-11-15 NOTE — PROGRESS NOTES
[facility]  Neuro Critical Care Progress Note    Fred Elena Patient Status:  Inpatient    9/3/1975 MRN [id number]   [facility] 6NE-A Attending Estefani Gonzalez, [address] [facility] Day # 14 PCP No primary care provider on file. Transdermal, Q24H  •  HYDROcodone-acetaminophen (NORCO) 5-325 MG per tab 1 tablet, 1 tablet, Oral, Q4H PRN **OR** HYDROcodone-acetaminophen (NORCO) 5-325 MG per tab 2 tablet, 2 tablet, Oral, Q4H PRN  •  famoTIDine (PEPCID) tab 20 mg, 20 mg, Oral, BID  •  d Intravenous, Q6H PRN **OR** Metoclopramide HCl (REGLAN) injection 10 mg, 10 mg, Intravenous, Q8H PRN  •  LORazepam (ATIVAN) injection 1 mg, 1 mg, Intravenous, Q15 Min PRN  •  ondansetron HCl (ZOFRAN) injection 4 mg, 4 mg, Intravenous, Q4H PRN  •  niCARdipi [phone]  11/15/18 9:37 AM

## 2018-11-15 NOTE — PROGRESS NOTES
[facility]  Neurosurgery Progress Note    Anneliese Clas Patient Status:  Inpatient    9/3/1975 MRN [id number]   [facility] 6NE-A Attending Carloz Cary, [address] [facility] [address] Day # 14 PCP No primary care provider on file.      Subject clinically. Assessment: This is a 44year old patient with eclampsia s/p c section now with aneurysmal SAH s/p coiling of L posterior inferior cerebellar artery, obstructive hydrocephalus s/p EVD insertion.    Borderline vasospasm    Plan:  OK to transf

## 2018-11-16 ENCOUNTER — APPOINTMENT (OUTPATIENT)
Dept: CT IMAGING | Facility: HOSPITAL | Age: 43
End: 2018-11-16
Attending: NURSE PRACTITIONER
Payer: MEDICAID

## 2018-11-16 PROCEDURE — 99233 SBSQ HOSP IP/OBS HIGH 50: CPT | Performed by: OTHER

## 2018-11-16 PROCEDURE — 99233 SBSQ HOSP IP/OBS HIGH 50: CPT | Performed by: HOSPITALIST

## 2018-11-16 PROCEDURE — 99231 SBSQ HOSP IP/OBS SF/LOW 25: CPT | Performed by: NURSE PRACTITIONER

## 2018-11-16 PROCEDURE — 70450 CT HEAD/BRAIN W/O DYE: CPT | Performed by: NURSE PRACTITIONER

## 2018-11-16 RX ORDER — METOPROLOL SUCCINATE 25 MG/1
25 TABLET, EXTENDED RELEASE ORAL
Status: DISCONTINUED | OUTPATIENT
Start: 2018-11-17 | End: 2018-11-16

## 2018-11-16 RX ORDER — TIZANIDINE 4 MG/1
4 TABLET ORAL EVERY 6 HOURS PRN
Status: DISCONTINUED | OUTPATIENT
Start: 2018-11-16 | End: 2018-11-17

## 2018-11-16 RX ORDER — MORPHINE SULFATE 4 MG/ML
2 INJECTION, SOLUTION INTRAMUSCULAR; INTRAVENOUS
Status: DISCONTINUED | OUTPATIENT
Start: 2018-11-16 | End: 2018-11-20

## 2018-11-16 RX ORDER — METOPROLOL SUCCINATE 25 MG/1
25 TABLET, EXTENDED RELEASE ORAL
Status: DISCONTINUED | OUTPATIENT
Start: 2018-11-17 | End: 2018-11-17

## 2018-11-16 RX ORDER — ASPIRIN 325 MG
325 TABLET ORAL DAILY
Qty: 30 TABLET | Refills: 1 | Status: SHIPPED | OUTPATIENT
Start: 2018-11-17

## 2018-11-16 RX ORDER — SODIUM CHLORIDE 9 MG/ML
INJECTION, SOLUTION INTRAVENOUS ONCE
Status: COMPLETED | OUTPATIENT
Start: 2018-11-16 | End: 2018-11-16

## 2018-11-16 NOTE — PLAN OF CARE
Assumed care at 1900. Pt A&Ox4. VSS on RA. NSR per tele. PRN norco and IV morphine for 8/10 headache. Lidoderm patches applied to lower back and behind the neck. Neuro checks intact, no deficits. BP within parameters. Pt tearful at times.   Resti

## 2018-11-16 NOTE — CM/SW NOTE
Op pharmacy quote for 5 days of Nimotop is 205.85, Keppra $12.54. Pt approved for [employer]. Discussed with [facility]. Op pharmacy to deliver med to floor today since OP pharmacy is closed on the weekend.   Jocelyn Mosley, 11/16/18, 4:30 PM

## 2018-11-16 NOTE — CM/SW NOTE
SW met with pt and , La Nena Gayle. Pt explained having a headache today. Support and encouragement given. The process of obtaining a [facility] sleep room over the weekend discussed with .   Family to talk to NICU charge RN ([id number]) this w

## 2018-11-16 NOTE — PROGRESS NOTES
[facility]  Progress Note     Humza Anaya Patient Status:  Inpatient    9/3/1975 MRN [id number]   [facility] 6NE-A Attending Lucia Muñoz, [address] [facility] Day # 15 PCP No primary care provider on file.      Chief Complaint: Epic.    Medications:   • [START ON 11/17/2018] Metoprolol Succinate ER  25 mg Oral Daily Beta Blocker   • Heparin Sodium (Porcine)  5,000 Units Subcutaneous Q8H [address]   • lidocaine  3 patch Transdermal Q24H   • famoTIDine  20 mg Oral BID   • levETIRAcetam

## 2018-11-16 NOTE — CM/SW NOTE
Pt has del meds approved for her nimotop and keppra with [facility]. OP pharmacy needs notification of dc if cleared to go home today.   Jocelyn Mosley, 11/16/18, 9:23 AM

## 2018-11-16 NOTE — PROGRESS NOTES
[facility]  Neurosurgery Progress Note    Kimberly Galdamez Patient Status:  Inpatient    9/3/1975 MRN [id number]   [facility] 7NE-A Attending El Castaneda, [address] [facility] Day # 15 PCP No primary care provider on file.      Subject

## 2018-11-16 NOTE — PROGRESS NOTES
[id number] [facility] Neurology Progress Note    Jurgen Campbell Patient Status:  Inpatient    9/3/1975 MRN [id number]   [facility] 7NE-A Attending Valentin Fine, [address] [facility] Day # 15 PCP No primary care provider on file. Neuroscience [place]           CC: Headache    Subjective:  Christal Kaba is a(n) 44year old female, who presented to the ED after having a seizure at home and was 31 weeks pregnant at that time.   Underwent emergent  for presumed ecla #16 from [facility]. Continue nimotop for full 21 days. IVF this AM and re-assess headache. Dr. Sergio Roach to follow.     HA Faulkner  [place] [phone]  Pager [phone]  11/16/2018, 9:31 AM

## 2018-11-17 PROCEDURE — 99233 SBSQ HOSP IP/OBS HIGH 50: CPT | Performed by: OTHER

## 2018-11-17 PROCEDURE — 99232 SBSQ HOSP IP/OBS MODERATE 35: CPT | Performed by: HOSPITALIST

## 2018-11-17 RX ORDER — SIMETHICONE 80 MG
80 TABLET,CHEWABLE ORAL 4 TIMES DAILY PRN
Status: DISCONTINUED | OUTPATIENT
Start: 2018-11-17 | End: 2018-11-20

## 2018-11-17 RX ORDER — NIMODIPINE 30 MG/1
30 CAPSULE, LIQUID FILLED ORAL
Status: DISCONTINUED | OUTPATIENT
Start: 2018-11-17 | End: 2018-11-20

## 2018-11-17 RX ORDER — TRAMADOL HYDROCHLORIDE 50 MG/1
50 TABLET ORAL ONCE
Status: COMPLETED | OUTPATIENT
Start: 2018-11-17 | End: 2018-11-18

## 2018-11-17 RX ORDER — TIZANIDINE 4 MG/1
2 TABLET ORAL EVERY 6 HOURS PRN
Status: DISCONTINUED | OUTPATIENT
Start: 2018-11-17 | End: 2018-11-20

## 2018-11-17 RX ORDER — BUTALBITAL, ACETAMINOPHEN AND CAFFEINE 50; 325; 40 MG/1; MG/1; MG/1
1 TABLET ORAL EVERY 4 HOURS PRN
Status: DISCONTINUED | OUTPATIENT
Start: 2018-11-17 | End: 2018-11-20

## 2018-11-17 NOTE — PLAN OF CARE
Assumed care at 0730  A&Ox4, VSS, NSR on tele  C/O worsening headache. Complete relief with xanaflex  BP under goal parameters. 250 ml IVF bolus administered and 0.9 at 100 ml/hr for 1L  No increase in BP. Hospitalist notified.  No new orders  Seizure preca

## 2018-11-17 NOTE — PROGRESS NOTES
[facility]    Progress Note    Evelyne Buchanan Patient Status:  Inpatient    9/3/1975 MRN [id number]   [facility] 7NE-A Attending Gladis Feliz, [address] [facility] Day # 16 PCP No primary care provider on file.      Subjective:  Mandi narcotics  Persistent headaches. CT head done yday was stable    Plan:   Change Nimotop to 30 mg Q 4hr due to hypotension  Monitor BP and headaches. Encourage adequate hydration and oral intake  Do not use Norco and minimize muscle relaxant use.  Take Kristen Caraballo

## 2018-11-17 NOTE — PLAN OF CARE
Assumed care at [address]. Pt A&Ox4. VSS on RA. BP below parameters. NSR per tele. No neuro deficits. PRN [place] and Zanaflex for pain. IV morphine x1 for 9/10 HA with improvement. Plan for possible DC today. Will continue to monitor.      Impaired Ac

## 2018-11-17 NOTE — PLAN OF CARE
Pt is alert and oriented x4. NSR on RA. Pt BP in the 80s this AM. 1 L fluid bolus given. Nimodipine dose decreased. Per neurology, pt will not be discharged today. Pt c/o severe headache. Fioracet given. DO NOT give norco. Pt is tearful at times.  Pt spends

## 2018-11-17 NOTE — PROGRESS NOTES
KAROL HOSPITALIST  Progress Note     Patricia Robins Patient Status:  Inpatient    9/3/1975 MRN [id number]   [facility] 6NE-A Attending Nimco Roach, [address] [facility] Day # 16 PCP No primary care provider on file.      Chief Complaint: chloride 0.9%  1,000 mL Intravenous Once   • Heparin Sodium (Porcine)  5,000 Units Subcutaneous [facility]   • lidocaine  3 patch Transdermal Q24H   • famoTIDine  20 mg Oral BID   • levETIRAcetam  1,000 mg Oral BID   • nimodipine  60 mg Oral 6 times per day

## 2018-11-18 PROCEDURE — 99233 SBSQ HOSP IP/OBS HIGH 50: CPT | Performed by: OTHER

## 2018-11-18 PROCEDURE — 99232 SBSQ HOSP IP/OBS MODERATE 35: CPT | Performed by: HOSPITALIST

## 2018-11-18 RX ORDER — ESCITALOPRAM OXALATE 5 MG/1
5 TABLET ORAL DAILY
Status: DISCONTINUED | OUTPATIENT
Start: 2018-11-18 | End: 2018-11-20

## 2018-11-18 NOTE — PLAN OF CARE
Pt with low bp this afternoon,  Denies dizziness, but c/o feeling tired. Recheck X2 and came to WNL. C/O feeling wetness at incison site on head. Assessed incision and no visible drainage or leaking noted. Will continue to monitor.

## 2018-11-18 NOTE — PROGRESS NOTES
[id number] Chery Coy Neurology Progress Note    Jurgen Campbell Patient Status:  Inpatient    9/3/1975 MRN [id number]   [facility] 7NE-A Attending Valentin Fine, [address] [facility] Day # 16 PCP No primary care provider on file.      Neurol Headache    Subjective:  Hailey Espino is a(n) 44year old female, who presented to the ED after having a seizure at home and was 31 weeks pregnant at that time. Underwent emergent  for presumed eclampsia. Head CT showed SAH.   EVD plac changed  ? Repeat head CT negative   · Post emergent   · Depressed mood  ? Low dose Lexapro started    Patient continues to complain of headache, but is better currently. More interactive today and was up walking in hallway.     Dr. Daniel Perea to fo

## 2018-11-18 NOTE — PLAN OF CARE
Assumed care at [address]. Pt A&Ox4. VSS on RA. NSR. BP stable. Neuro intact. Fioricet and zanaflex given for HA/pain. Will continue to monitor. 0500- BP below parameters, Dr. Ginny Campbell notified, will continue to monitor.      Impaired Activities of

## 2018-11-18 NOTE — PROGRESS NOTES
KAROL HOSPITALIST  Progress Note     [facility] Patient Status:  Inpatient    9/3/1975 MRN [id number]   [facility] 6NE-A Attending Michalene Gaucher, [address] [facility] Day # 16 PCP No primary care provider on file.      Chief Complaint: Once   • Heparin Sodium (Porcine)  5,000 Units Subcutaneous [facility]   • lidocaine  3 patch Transdermal Q24H   • famoTIDine  20 mg Oral BID   • levETIRAcetam  1,000 mg Oral BID   • aspirin  325 mg Oral Daily    Or   • aspirin  300 mg Rectal Daily   • Carbop

## 2018-11-19 PROCEDURE — 99232 SBSQ HOSP IP/OBS MODERATE 35: CPT | Performed by: OTHER

## 2018-11-19 PROCEDURE — 99232 SBSQ HOSP IP/OBS MODERATE 35: CPT | Performed by: HOSPITALIST

## 2018-11-19 RX ORDER — TIZANIDINE 2 MG/1
2 TABLET ORAL EVERY 6 HOURS PRN
Qty: 20 TABLET | Refills: 0 | Status: SHIPPED | OUTPATIENT
Start: 2018-11-19 | End: 2018-12-05

## 2018-11-19 RX ORDER — ESCITALOPRAM OXALATE 5 MG/1
5 TABLET ORAL DAILY
Qty: 14 TABLET | Refills: 0 | Status: SHIPPED | OUTPATIENT
Start: 2018-11-20 | End: 2018-12-05

## 2018-11-19 NOTE — PROGRESS NOTES
[id number] Chery Coy Neurology Progress Note    Jennifer Rising Patient Status:  Inpatient    9/3/1975 MRN [id number]   [facility] 7NE-A Attending Keri Min, [address] [facility] Day # 18 PCP No primary care provider on file.          Barriga Oral Daily    Or   • aspirin  300 mg Rectal Daily   • Carboprost Tromethamine  250 mcg Intramuscular Once   • Senna  17.2 mg Oral Nightly   • Lidocaine HCl (PF)  20 mL Infiltration Once       Patient Active Problem List:     Normal pregnancy     IUGR (intr 30 mg Q 4 hours  Today she is complaining of leakage from her EVD site when she coughs - NS to come and evaluate   Neurologically she is stable to go  Home later today - she need to get up and walk around       I have reviewed history and examined patient

## 2018-11-19 NOTE — PLAN OF CARE
Pt AOx4.  at bedside. Pt c/o HA 5/10. Fiorocet PO pain relief. Neuro Intact. BP WNL. RA.  . Lidocaine patches x3 to back. Incision to head intact no drainage noted. Will continue to monitor.       [id number] Pt request throat lozenge, and pt ha

## 2018-11-19 NOTE — PROGRESS NOTES
KAROL HOSPITALIST  Progress Note     Hailey Espino Patient Status:  Inpatient    9/3/1975 MRN [id number]   [facility] 6NE-A Attending Sully Small, [address] [facility] Day # 18 PCP No primary care provider on file.      Chief Complaint: mg Oral BID   • aspirin  325 mg Oral Daily    Or   • aspirin  300 mg Rectal Daily   • Carboprost Tromethamine  250 mcg Intramuscular Once   • Senna  17.2 mg Oral Nightly   • Lidocaine HCl (PF)  20 mL Infiltration Once       ASSESSMENT / PLAN:     1.  RODRICK an

## 2018-11-19 NOTE — CM/SW NOTE
SW met with pt and , Luis Antonio Hawkins. Pt states that she continues to leak from her head. SW provided support and reviewed coping skills as pt is adjusting to a slower pace of moving right now. SW spoke to [facility] Aakash Timmons.   Discharged is anticipated for 11

## 2018-11-20 VITALS
RESPIRATION RATE: 21 BRPM | TEMPERATURE: 97 F | WEIGHT: 147.5 LBS | HEIGHT: 57 IN | DIASTOLIC BLOOD PRESSURE: 76 MMHG | BODY MASS INDEX: 31.82 KG/M2 | HEART RATE: 76 BPM | SYSTOLIC BLOOD PRESSURE: 122 MMHG | OXYGEN SATURATION: 97 %

## 2018-11-20 PROCEDURE — 99232 SBSQ HOSP IP/OBS MODERATE 35: CPT | Performed by: PHYSICIAN ASSISTANT

## 2018-11-20 PROCEDURE — 99232 SBSQ HOSP IP/OBS MODERATE 35: CPT | Performed by: HOSPITALIST

## 2018-11-20 PROCEDURE — 99232 SBSQ HOSP IP/OBS MODERATE 35: CPT | Performed by: OTHER

## 2018-11-20 RX ORDER — NIMODIPINE 30 MG/1
30 CAPSULE, LIQUID FILLED ORAL
Qty: 15 CAPSULE | Refills: 0 | Status: SHIPPED | OUTPATIENT
Start: 2018-11-20 | End: 2018-12-13

## 2018-11-20 RX ORDER — LEVETIRACETAM 750 MG/1
750 TABLET ORAL 2 TIMES DAILY
Qty: 60 TABLET | Refills: 1 | Status: SHIPPED | OUTPATIENT
Start: 2018-11-20 | End: 2018-12-21

## 2018-11-20 RX ORDER — BUTALBITAL, ACETAMINOPHEN AND CAFFEINE 50; 325; 40 MG/1; MG/1; MG/1
1 TABLET ORAL EVERY 6 HOURS PRN
Qty: 30 TABLET | Refills: 0 | Status: SHIPPED | OUTPATIENT
Start: 2018-11-20 | End: 2018-12-05

## 2018-11-20 NOTE — PLAN OF CARE
A&Ox4  Neuro checks Q4H  No deficits noted  C/o headache, PRN meds given see MAR  NSR on tele  Up ad kaley tolerating well  Notified Veena RAMIREZN this am of drainage from incision on head  Plan for d/c tomorrow  Will continue to monitor    Impaired Act

## 2018-11-20 NOTE — PLAN OF CARE
Assumed care at [address] [facility]. AOx4. Spouse at bedside. C/o headache and generalized pain. Pain meds given. Neuro intact. No drainage noted to right scalp. Ambulates in hallway couple times with steady gait. Plan of care discussed with pt and spouse.    Call

## 2018-11-20 NOTE — PROGRESS NOTES
Called to bedside for leaking EVD site. No drainage noted but pillow wet. Additional dermabond applied. No further drainage noted.   Will reassess

## 2018-11-20 NOTE — PROGRESS NOTES
[id number] Chery Coy Neurology Progress Note    Bill Mckee Patient Status:  Inpatient    9/3/1975 MRN [id number]   [facility] 7NE-A Attending Josh Maloney, [address] [facility] Day # 23 PCP No primary care provider on file.          Barriga mcg Intramuscular Once   • Senna  17.2 mg Oral Nightly   • Lidocaine HCl (PF)  20 mL Infiltration Once       Patient Active Problem List:     Normal pregnancy     IUGR (intrauterine growth restriction) in prior pregnancy, pregnant     Eclampsia     [place] progress,     Neurologically she is stable  To have discharge planning,   Will sign off, please call with question          I have reviewed history and examined patient independently,  with APN, I reviewed imaging film, labs  Myself,  formed treatment plan

## 2018-11-20 NOTE — CM/SW NOTE
SW spoke to charming charlie, [facility]. Discharge not confirmed. SW to follow to determine discharge plan for the [facility]. Social work to remain available for support or any discharge planning needs.     Elijah Yee MSW, LCSW   for [employer]

## 2018-11-20 NOTE — PROGRESS NOTES
KAROL HOSPITALIST  Progress Note     Krista Cheney Patient Status:  Inpatient    9/3/1975 MRN [id number]   [facility] 6NE-A Attending Belen Causey, [address] [facility] Day # 23 PCP No primary care provider on file.      Chief Complaint: Intramuscular Once   • Senna  17.2 mg Oral Nightly   • Lidocaine HCl (PF)  20 mL Infiltration Once       ASSESSMENT / PLAN:     1. SAH and IVH with obstructive hydrocephalus  1. Due to ruptured dissecting left PICA  2.  S/p twist drill craniotomy and EVD on

## 2018-11-21 NOTE — PLAN OF CARE
Received patient today alert and oriented x3 and having a head ache and cough. Pain medication and cough medication given as ordered. Patient states cough is much better during the day time. She ambulated in the halls several times today.  She used her IS a

## 2018-12-05 DIAGNOSIS — I60.9 SAH (SUBARACHNOID HEMORRHAGE) (HCC): Primary | ICD-10-CM

## 2018-12-05 RX ORDER — NIMODIPINE 30 MG/1
30 CAPSULE, LIQUID FILLED ORAL
Qty: 30 CAPSULE | Refills: 0 | Status: CANCELLED | OUTPATIENT
Start: 2018-12-05

## 2018-12-05 RX ORDER — TIZANIDINE 2 MG/1
2 TABLET ORAL EVERY 6 HOURS PRN
Qty: 20 TABLET | Refills: 0 | Status: SHIPPED | OUTPATIENT
Start: 2018-12-05

## 2018-12-05 RX ORDER — BUTALBITAL, ACETAMINOPHEN AND CAFFEINE 50; 325; 40 MG/1; MG/1; MG/1
1 TABLET ORAL EVERY 6 HOURS PRN
Qty: 30 TABLET | Refills: 0 | Status: SHIPPED | OUTPATIENT
Start: 2018-12-05

## 2018-12-05 RX ORDER — ESCITALOPRAM OXALATE 5 MG/1
5 TABLET ORAL DAILY
Qty: 30 TABLET | Refills: 0 | Status: SHIPPED | OUTPATIENT
Start: 2018-12-05 | End: 2018-12-13

## 2018-12-06 ENCOUNTER — TELEPHONE (OUTPATIENT)
Dept: CASE MANAGEMENT | Facility: HOSPITAL | Age: 43
End: 2018-12-06

## 2018-12-06 NOTE — PROGRESS NOTES
SILVIANO met with parents Wendi. Shayan  stated that she needs to get her medications because she needs refills. CM asked if she submitted her scripts to a pharmacy? Shayan Vines stated yes to [facility] outpatient.  SILVIANO called over to see if medications w

## 2018-12-13 ENCOUNTER — POSTPARTUM (OUTPATIENT)
Dept: OBGYN CLINIC | Facility: CLINIC | Age: 43
End: 2018-12-13
Payer: COMMERCIAL

## 2018-12-13 VITALS
SYSTOLIC BLOOD PRESSURE: 104 MMHG | DIASTOLIC BLOOD PRESSURE: 68 MMHG | WEIGHT: 147 LBS | HEIGHT: 59 IN | HEART RATE: 80 BPM | BODY MASS INDEX: 29.64 KG/M2

## 2018-12-13 NOTE — DISCHARGE SUMMARY
Admittion Date:18  Discharge Date:18  Diagnosis:IUGR (intrauterine growth restriction) in prior pregnancy, pregnant     Eclampsia     Oligohydramnios, antepartum     31 weeks gestation of pregnancy     H/O:      Seizure ([facility] [address]     SAH (s

## 2018-12-13 NOTE — PROGRESS NOTES
SARAH    Gabino Gregorio is a 44year old female [id number] here for 6 week post-partum visit. Patient delivered a  male infant on 11/1/18 via repeat CSx. Patient desires tubal ligation for contraception. Patient is bottle feeding.    Patient denies s tenderness  Adnexa: normal without masses or tenderness  Perineum: well healed perineum  Anus: no hemorroids       ASSESSMENT/PLAN  [place] Abhijit was seen today for other.     Diagnoses and all orders for this visit:    Postpartum exam      Discussed all options

## 2018-12-18 ENCOUNTER — TELEPHONE (OUTPATIENT)
Dept: SURGERY | Facility: CLINIC | Age: 43
End: 2018-12-18

## 2018-12-18 ENCOUNTER — TELEPHONE (OUTPATIENT)
Dept: CASE MANAGEMENT | Facility: HOSPITAL | Age: 43
End: 2018-12-18

## 2018-12-18 NOTE — TELEPHONE ENCOUNTER
Called and informed pt of message below, she states she is still in the hospital with her baby. She will call to schedule an MRA and call to reschedule her appointment.

## 2018-12-18 NOTE — PROGRESS NOTES
Pt requesting to meet with SW/CM regarding follow up appointments and testing. Pt reports her [facility] [place] active [place], and she needs follow up care related to her stay at [facility]. Pt states she is currently uninsured. Pt states she will contact [facility] neuro group to see how they will bill for her appointments. SW/CM to follow up with her regarding MRA test she has scheduled for 12/29.      Carlin Merino 197

## 2018-12-18 NOTE — TELEPHONE ENCOUNTER
Please call and notify patient that from the hospital she had orders to complete a 1 month MRA prior to her visit. I don't see the results in yet or that she has one scheduled.

## 2018-12-18 NOTE — PROGRESS NOTES
CM called Praveen Mares in Radiology and updated him on out pt  status of Michelle needing MRA but [facility] will not cover test. Pt is not cleared to go back to [place] to get test. Praveen Mares sent CM to Tashia Love, Radiology Adm. Chico Mckeon reviewed situation with CM and stated that he would need to talk to Jason Soriano. Chico Mckeon will call CM back once he has talked to [facility].

## 2018-12-21 ENCOUNTER — OFFICE VISIT (OUTPATIENT)
Dept: NEUROLOGY | Facility: CLINIC | Age: 43
End: 2018-12-21
Payer: COMMERCIAL

## 2018-12-21 VITALS
WEIGHT: 147 LBS | DIASTOLIC BLOOD PRESSURE: 70 MMHG | SYSTOLIC BLOOD PRESSURE: 130 MMHG | BODY MASS INDEX: 30 KG/M2 | HEART RATE: 82 BPM | RESPIRATION RATE: 18 BRPM

## 2018-12-21 DIAGNOSIS — I60.9 SAH (SUBARACHNOID HEMORRHAGE) (HCC): ICD-10-CM

## 2018-12-21 DIAGNOSIS — R56.9 SEIZURE (HCC): Primary | ICD-10-CM

## 2018-12-21 PROCEDURE — 99214 OFFICE O/P EST MOD 30 MIN: CPT | Performed by: OTHER

## 2018-12-21 RX ORDER — LEVETIRACETAM 500 MG/1
500 TABLET ORAL 2 TIMES DAILY
Qty: 60 TABLET | Refills: 1 | Status: SHIPPED | OUTPATIENT
Start: 2018-12-21

## 2018-12-21 NOTE — PROGRESS NOTES
The patient has a history of seizures. The patient states no seizures since the 11/01/18. The patient states she feels unbalanced./ The patient has not fallen. The patient has slight headaches. The patient has on and off dizziness.

## 2018-12-21 NOTE — PROGRESS NOTES
HPI:    Patient ID: Latosha Gibson is a 44year old female. Patient presented for hospital follow up for [facility] that occurred on 11/1 and seizure at the onset.  She is from [place] and was visiting her daughter and apparently had a generalized Kristopher Gaetano Paternal Grandfather       Social History    Tobacco Use      Smoking status: Never Smoker      Smokeless tobacco: Never Used    Alcohol use: No      Frequency: Never    Drug use: No             Review of Systems   Constitutional: Negative.     HENT: Maureen Rain intact. Cranial nerves:   II, III, IV, VI :Pupils round, equal and reactive to light  and accommodation bilaterally. Extraocular muscle intact. Visual fields intact. V: Normal facial sensation   VII: Face is symmetric with normal strength.    VIII: Nor 500 MG Oral Tab 60 tablet 1     Sig: Take 1 tablet (500 mg total) by mouth 2 (two) times daily.        Imaging & Referrals:  None       IK#[id number]

## 2018-12-21 NOTE — PATIENT INSTRUCTIONS
1. Wean off Keppra but would it after the holidays    Reduce dose to 500 mg twice daily then after a week go to 500 mg daily for about a week and then discontinue.     2. Follow up in clinic in about a month

## 2018-12-29 ENCOUNTER — HOSPITAL ENCOUNTER (OUTPATIENT)
Dept: MRI IMAGING | Facility: HOSPITAL | Age: 43
Discharge: HOME | End: 2018-12-29
Attending: NURSE PRACTITIONER
Payer: MEDICAID

## 2018-12-29 DIAGNOSIS — I60.7 RUPTURED CEREBRAL ANEURYSM (HCC): ICD-10-CM

## 2018-12-29 PROCEDURE — 70544 MR ANGIOGRAPHY HEAD W/O DYE: CPT | Performed by: NURSE PRACTITIONER

## 2019-01-20 ENCOUNTER — HOSPITAL ENCOUNTER (INPATIENT)
Facility: HOSPITAL | Age: 44
LOS: 7 days | Discharge: HOME | DRG: 330 | End: 2019-01-28
Attending: EMERGENCY MEDICINE | Admitting: HOSPITALIST
Payer: MEDICAID

## 2019-01-20 ENCOUNTER — APPOINTMENT (OUTPATIENT)
Dept: CT IMAGING | Facility: HOSPITAL | Age: 44
DRG: 330 | End: 2019-01-20
Attending: EMERGENCY MEDICINE
Payer: MEDICAID

## 2019-01-20 DIAGNOSIS — K57.92 ACUTE DIVERTICULITIS: Primary | ICD-10-CM

## 2019-01-20 DIAGNOSIS — K57.92 DIVERTICULITIS: ICD-10-CM

## 2019-01-20 LAB
ALBUMIN SERPL-MCNC: 3.7 G/DL (ref 3.1–4.5)
ALBUMIN/GLOB SERPL: 0.9 {RATIO} (ref 1–2)
ALP LIVER SERPL-CCNC: 90 U/L (ref 37–98)
ALT SERPL-CCNC: 80 U/L (ref 14–54)
ANION GAP SERPL CALC-SCNC: 8 MMOL/L (ref 0–18)
AST SERPL-CCNC: 46 U/L (ref 15–41)
BASOPHILS # BLD AUTO: 0.02 X10(3) UL (ref 0–0.1)
BASOPHILS NFR BLD AUTO: 0.1 %
BILIRUB SERPL-MCNC: 0.2 MG/DL (ref 0.1–2)
BILIRUB UR QL STRIP.AUTO: NEGATIVE
BUN BLD-MCNC: 13 MG/DL (ref 8–20)
BUN/CREAT SERPL: 16.9 (ref 10–20)
CALCIUM BLD-MCNC: 8.2 MG/DL (ref 8.3–10.3)
CHLORIDE SERPL-SCNC: 107 MMOL/L (ref 101–111)
CO2 SERPL-SCNC: 26 MMOL/L (ref 22–32)
COLOR UR AUTO: YELLOW
CREAT BLD-MCNC: 0.77 MG/DL (ref 0.55–1.02)
EOSINOPHIL # BLD AUTO: 0.16 X10(3) UL (ref 0–0.3)
EOSINOPHIL NFR BLD AUTO: 1.2 %
ERYTHROCYTE [DISTWIDTH] IN BLOOD BY AUTOMATED COUNT: 14 % (ref 11.5–16)
GLOBULIN PLAS-MCNC: 4 G/DL (ref 2.8–4.4)
GLUCOSE BLD-MCNC: 133 MG/DL (ref 70–99)
GLUCOSE UR STRIP.AUTO-MCNC: NEGATIVE MG/DL
HCT VFR BLD AUTO: 34.9 % (ref 34–50)
HGB BLD-MCNC: 11.6 G/DL (ref 12–16)
IMMATURE GRANULOCYTE COUNT: 0.04 X10(3) UL (ref 0–1)
IMMATURE GRANULOCYTE RATIO %: 0.3 %
KETONES UR STRIP.AUTO-MCNC: NEGATIVE MG/DL
LEUKOCYTE ESTERASE UR QL STRIP.AUTO: NEGATIVE
LYMPHOCYTES # BLD AUTO: 1.97 X10(3) UL (ref 0.9–4)
LYMPHOCYTES NFR BLD AUTO: 14.6 %
M PROTEIN MFR SERPL ELPH: 7.7 G/DL (ref 6.4–8.2)
MCH RBC QN AUTO: 27.4 PG (ref 27–33.2)
MCHC RBC AUTO-ENTMCNC: 33.2 G/DL (ref 31–37)
MCV RBC AUTO: 82.5 FL (ref 81–100)
MONOCYTES # BLD AUTO: 0.43 X10(3) UL (ref 0.1–1)
MONOCYTES NFR BLD AUTO: 3.2 %
NEUTROPHIL ABS PRELIM: 10.84 X10 (3) UL (ref 1.3–6.7)
NEUTROPHILS # BLD AUTO: 10.84 X10(3) UL (ref 1.3–6.7)
NEUTROPHILS NFR BLD AUTO: 80.6 %
NITRITE UR QL STRIP.AUTO: NEGATIVE
OSMOLALITY SERPL CALC.SUM OF ELEC: 294 MOSM/KG (ref 275–295)
PH UR STRIP.AUTO: 5 [PH] (ref 4.5–8)
PLATELET # BLD AUTO: 359 10(3)UL (ref 150–450)
POCT LOT NUMBER: NORMAL
POCT URINE PREGNANCY: NEGATIVE
POTASSIUM SERPL-SCNC: 3.3 MMOL/L (ref 3.6–5.1)
PROT UR STRIP.AUTO-MCNC: NEGATIVE MG/DL
RBC # BLD AUTO: 4.23 X10(6)UL (ref 3.8–5.1)
RBC #/AREA URNS AUTO: >10 /HPF
RED CELL DISTRIBUTION WIDTH-SD: 41 FL (ref 35.1–46.3)
SODIUM SERPL-SCNC: 141 MMOL/L (ref 136–144)
SP GR UR STRIP.AUTO: 1.02 (ref 1–1.03)
UROBILINOGEN UR STRIP.AUTO-MCNC: <2 MG/DL
WBC # BLD AUTO: 13.5 X10(3) UL (ref 4–13)

## 2019-01-20 PROCEDURE — 74177 CT ABD & PELVIS W/CONTRAST: CPT | Performed by: EMERGENCY MEDICINE

## 2019-01-20 RX ORDER — MORPHINE SULFATE 4 MG/ML
6 INJECTION, SOLUTION INTRAMUSCULAR; INTRAVENOUS EVERY 30 MIN PRN
Status: DISCONTINUED | OUTPATIENT
Start: 2019-01-20 | End: 2019-01-21 | Stop reason: HOSPADM

## 2019-01-20 RX ORDER — SODIUM CHLORIDE 9 MG/ML
INJECTION, SOLUTION INTRAVENOUS CONTINUOUS
Status: DISCONTINUED | OUTPATIENT
Start: 2019-01-20 | End: 2019-01-23

## 2019-01-20 RX ORDER — ONDANSETRON 2 MG/ML
4 INJECTION INTRAMUSCULAR; INTRAVENOUS ONCE
Status: COMPLETED | OUTPATIENT
Start: 2019-01-20 | End: 2019-01-20

## 2019-01-20 RX ORDER — ONDANSETRON 2 MG/ML
4 INJECTION INTRAMUSCULAR; INTRAVENOUS ONCE
Status: DISCONTINUED | OUTPATIENT
Start: 2019-01-20 | End: 2019-01-20

## 2019-01-20 RX ORDER — KETOROLAC TROMETHAMINE 30 MG/ML
15 INJECTION, SOLUTION INTRAMUSCULAR; INTRAVENOUS ONCE
Status: COMPLETED | OUTPATIENT
Start: 2019-01-20 | End: 2019-01-20

## 2019-01-21 PROBLEM — E87.6 HYPOKALEMIA: Status: ACTIVE | Noted: 2019-01-21

## 2019-01-21 PROBLEM — K57.92 ACUTE DIVERTICULITIS: Status: ACTIVE | Noted: 2019-01-21

## 2019-01-21 PROBLEM — R73.9 HYPERGLYCEMIA: Status: ACTIVE | Noted: 2019-01-21

## 2019-01-21 PROBLEM — D64.9 ANEMIA: Status: ACTIVE | Noted: 2019-01-21

## 2019-01-21 LAB
GLUCOSE BLD-MCNC: 139 MG/DL (ref 65–99)
POTASSIUM SERPL-SCNC: 2.7 MMOL/L (ref 3.6–5.1)

## 2019-01-21 PROCEDURE — 99254 IP/OBS CNSLTJ NEW/EST MOD 60: CPT | Performed by: SURGERY

## 2019-01-21 PROCEDURE — 99223 1ST HOSP IP/OBS HIGH 75: CPT | Performed by: HOSPITALIST

## 2019-01-21 RX ORDER — POTASSIUM CHLORIDE 20 MEQ/1
40 TABLET, EXTENDED RELEASE ORAL DAILY
Status: DISCONTINUED | OUTPATIENT
Start: 2019-01-21 | End: 2019-01-21

## 2019-01-21 RX ORDER — MORPHINE SULFATE 4 MG/ML
2 INJECTION, SOLUTION INTRAMUSCULAR; INTRAVENOUS EVERY 2 HOUR PRN
Status: DISCONTINUED | OUTPATIENT
Start: 2019-01-21 | End: 2019-01-22

## 2019-01-21 RX ORDER — SODIUM CHLORIDE 9 MG/ML
125 INJECTION, SOLUTION INTRAVENOUS CONTINUOUS
Status: DISCONTINUED | OUTPATIENT
Start: 2019-01-21 | End: 2019-01-23

## 2019-01-21 RX ORDER — METRONIDAZOLE 500 MG/100ML
500 INJECTION, SOLUTION INTRAVENOUS EVERY 8 HOURS
Status: DISCONTINUED | OUTPATIENT
Start: 2019-01-21 | End: 2019-01-23

## 2019-01-21 RX ORDER — POTASSIUM CHLORIDE 20 MEQ/1
40 TABLET, EXTENDED RELEASE ORAL EVERY 4 HOURS
Status: DISCONTINUED | OUTPATIENT
Start: 2019-01-21 | End: 2019-01-21

## 2019-01-21 RX ORDER — LEVOFLOXACIN 5 MG/ML
750 INJECTION, SOLUTION INTRAVENOUS ONCE
Status: COMPLETED | OUTPATIENT
Start: 2019-01-21 | End: 2019-01-21

## 2019-01-21 RX ORDER — POLYETHYLENE GLYCOL 3350 17 G/17G
17 POWDER, FOR SOLUTION ORAL DAILY PRN
Status: DISCONTINUED | OUTPATIENT
Start: 2019-01-21 | End: 2019-01-23

## 2019-01-21 RX ORDER — SODIUM CHLORIDE 9 MG/ML
INJECTION, SOLUTION INTRAVENOUS CONTINUOUS
Status: ACTIVE | OUTPATIENT
Start: 2019-01-21 | End: 2019-01-21

## 2019-01-21 RX ORDER — SODIUM PHOSPHATE, DIBASIC AND SODIUM PHOSPHATE, MONOBASIC 7; 19 G/133ML; G/133ML
1 ENEMA RECTAL ONCE AS NEEDED
Status: DISCONTINUED | OUTPATIENT
Start: 2019-01-21 | End: 2019-01-23

## 2019-01-21 RX ORDER — LEVOFLOXACIN 5 MG/ML
750 INJECTION, SOLUTION INTRAVENOUS EVERY 24 HOURS
Status: DISCONTINUED | OUTPATIENT
Start: 2019-01-21 | End: 2019-01-23

## 2019-01-21 RX ORDER — DOCUSATE SODIUM 100 MG/1
100 CAPSULE, LIQUID FILLED ORAL 2 TIMES DAILY
Status: DISCONTINUED | OUTPATIENT
Start: 2019-01-21 | End: 2019-01-22

## 2019-01-21 RX ORDER — BISACODYL 10 MG
10 SUPPOSITORY, RECTAL RECTAL
Status: DISCONTINUED | OUTPATIENT
Start: 2019-01-21 | End: 2019-01-28

## 2019-01-21 RX ORDER — POTASSIUM CHLORIDE 20 MEQ/1
40 TABLET, EXTENDED RELEASE ORAL ONCE
Status: COMPLETED | OUTPATIENT
Start: 2019-01-21 | End: 2019-01-21

## 2019-01-21 RX ORDER — MORPHINE SULFATE 4 MG/ML
1 INJECTION, SOLUTION INTRAMUSCULAR; INTRAVENOUS EVERY 2 HOUR PRN
Status: DISCONTINUED | OUTPATIENT
Start: 2019-01-21 | End: 2019-01-22

## 2019-01-21 RX ORDER — ACETAMINOPHEN 325 MG/1
650 TABLET ORAL EVERY 6 HOURS PRN
Status: DISCONTINUED | OUTPATIENT
Start: 2019-01-21 | End: 2019-01-23

## 2019-01-21 RX ORDER — POTASSIUM CHLORIDE 20 MEQ/1
40 TABLET, EXTENDED RELEASE ORAL EVERY 4 HOURS
Status: COMPLETED | OUTPATIENT
Start: 2019-01-21 | End: 2019-01-22

## 2019-01-21 RX ORDER — SODIUM CHLORIDE 9 MG/ML
INJECTION, SOLUTION INTRAVENOUS CONTINUOUS
Status: DISCONTINUED | OUTPATIENT
Start: 2019-01-21 | End: 2019-01-23

## 2019-01-21 RX ORDER — HYDROMORPHONE HYDROCHLORIDE 1 MG/ML
0.5 INJECTION, SOLUTION INTRAMUSCULAR; INTRAVENOUS; SUBCUTANEOUS EVERY 30 MIN PRN
Status: ACTIVE | OUTPATIENT
Start: 2019-01-21 | End: 2019-01-21

## 2019-01-21 RX ORDER — METRONIDAZOLE 500 MG/100ML
500 INJECTION, SOLUTION INTRAVENOUS ONCE
Status: COMPLETED | OUTPATIENT
Start: 2019-01-21 | End: 2019-01-21

## 2019-01-21 RX ORDER — LEVETIRACETAM 500 MG/1
500 TABLET ORAL DAILY
Status: DISCONTINUED | OUTPATIENT
Start: 2019-01-22 | End: 2019-01-28

## 2019-01-21 RX ORDER — ONDANSETRON 2 MG/ML
4 INJECTION INTRAMUSCULAR; INTRAVENOUS EVERY 6 HOURS PRN
Status: DISCONTINUED | OUTPATIENT
Start: 2019-01-21 | End: 2019-01-23

## 2019-01-21 RX ORDER — LEVETIRACETAM 500 MG/1
500 TABLET ORAL 2 TIMES DAILY
Status: DISCONTINUED | OUTPATIENT
Start: 2019-01-21 | End: 2019-01-21

## 2019-01-21 RX ORDER — ONDANSETRON 2 MG/ML
4 INJECTION INTRAMUSCULAR; INTRAVENOUS EVERY 4 HOURS PRN
Status: DISCONTINUED | OUTPATIENT
Start: 2019-01-21 | End: 2019-01-21 | Stop reason: HOSPADM

## 2019-01-21 RX ORDER — MORPHINE SULFATE 4 MG/ML
4 INJECTION, SOLUTION INTRAMUSCULAR; INTRAVENOUS EVERY 2 HOUR PRN
Status: DISCONTINUED | OUTPATIENT
Start: 2019-01-21 | End: 2019-01-22

## 2019-01-21 RX ORDER — METOCLOPRAMIDE HYDROCHLORIDE 5 MG/ML
10 INJECTION INTRAMUSCULAR; INTRAVENOUS EVERY 8 HOURS PRN
Status: DISCONTINUED | OUTPATIENT
Start: 2019-01-21 | End: 2019-01-23

## 2019-01-21 NOTE — CONSULTS
Cuca Kruger is a 44year old female  Patient presents with:  Abdomen/Flank Pain (GI/)      REFERRED BY    Patient presents with complaint of lower abdominal pain. Patient states the pain occurred in her lower abdomen last night after eating. Oral Tab Take 1 tablet by mouth every 6 (six) hours as needed for Headaches. Disp: 30 tablet Rfl: 0   TiZANidine HCl 2 MG Oral Tab Take 1 tablet (2 mg total) by mouth every 6 (six) hours as needed.  Disp: 20 tablet Rfl: 0     @ALL@  Family History   Problem nares patent, mouth moist, pharynx w/o erythema  NECK: supple, no JVD, no adenopathy, no thyromegaly  RESPIRATORY: clear to auscultation, no rales , rhonchi or wheezing  CARDIOVASCULAR: RRR, murmur negative  ABDOMEN: normal active BS, soft, nondistended  n

## 2019-01-21 NOTE — ED NOTES
Report given to Brenda Schreiber at ext [phone]. Vandana Gifford states she will call back when the room is ready.

## 2019-01-21 NOTE — ED INITIAL ASSESSMENT (HPI)
Patient complaining of sudden onset of \"constant sharp pain\" that started 20 minutes ago; states she was playing poker at the time. Reports nausea. Reports diarrhea x 1. Denies vomiting.

## 2019-01-21 NOTE — H&P
KAROL HOSPITALIST  History and Physical     Radha Lester Patient Status:  Emergency    9/3/1975 MRN [id number]   Location [address] [facility] Attending Teresa Ray MD   Hosp Day # 0 PCP PHYSICIAN NONSTAFF     Chief C Problems Maternal Grandfather    • No Known Problems Paternal Grandmother    • No Known Problems Paternal Grandfather        Allergies: No Known Allergies    Medications:    No current facility-administered medications on file prior to encounter.    Current 01/20/19   2225   WBC  13.5*   HGB  11.6*   MCV  82.5   PLT  359.0       Recent Labs   Lab  01/20/19   2225   GLU  133*   BUN  13   CREATSERUM  0.77   GFRAA  110   GFRNAA  96   CA  8.2*   ALB  3.7   NA  141   K  3.3*   CL  107   CO2  26.0   ALKPHO  90   AS

## 2019-01-21 NOTE — ED PROVIDER NOTES
Patient Seen in: [facility] 4nw-a    History   Patient presents with:  Abdomen/Flank Pain (GI/)    Stated Complaint: sudden onset of mid abd pain  nausea+  x 20 minutes    HPI    Patient is a 81-year-old female comes in emergency room for evaluati Severe amount of discomfort due to pain. alert and oriented X 3   HEENT: Normocephalic, atraumatic. Moist mucous membranes. Pupils equal round reactive to light and accommodation, extraocular motion is intact, sclerae white, conjunctiva is pink.   Iqra Nguyen Abnormality         Status                     ---------                               -----------         ------                     CBC W/ DIFFERENTIAL[[id number]]          Abnormal            Final result                 Please view marked mural thickening and. Diverticular inflammatory changes with edema seen within the sigmoid mesenteric and distal small bowel mesenteries. There is a moderate to large amount of posterior cul-de-sac fluid.   There is dependent ascites/fluid above th Intravenous Given 1/20/19 7070)   metRONIDAZOLE in NaCl (FLAGYL) 5 mg/ml IVPB premix 500 mg (0 mg Intravenous Stopped 1/21/19 0209)   Potassium Chloride ER (K-DUR M20) CR tab 40 mEq (40 mEq Oral Given 1/21/19 0054)       MDM   Patient is a 59-year-old fema

## 2019-01-22 LAB — POTASSIUM SERPL-SCNC: 3.9 MMOL/L (ref 3.6–5.1)

## 2019-01-22 PROCEDURE — 99232 SBSQ HOSP IP/OBS MODERATE 35: CPT | Performed by: SURGERY

## 2019-01-22 PROCEDURE — 99232 SBSQ HOSP IP/OBS MODERATE 35: CPT | Performed by: HOSPITALIST

## 2019-01-22 RX ORDER — HEPARIN SODIUM 5000 [USP'U]/ML
5000 INJECTION, SOLUTION INTRAVENOUS; SUBCUTANEOUS EVERY 8 HOURS SCHEDULED
Status: DISCONTINUED | OUTPATIENT
Start: 2019-01-22 | End: 2019-01-23

## 2019-01-22 RX ORDER — MORPHINE SULFATE 4 MG/ML
1 INJECTION, SOLUTION INTRAMUSCULAR; INTRAVENOUS EVERY 2 HOUR PRN
Status: DISCONTINUED | OUTPATIENT
Start: 2019-01-22 | End: 2019-01-23

## 2019-01-22 RX ORDER — SODIUM CHLORIDE 9 MG/ML
INJECTION, SOLUTION INTRAVENOUS ONCE
Status: COMPLETED | OUTPATIENT
Start: 2019-01-22 | End: 2019-01-22

## 2019-01-22 RX ORDER — MORPHINE SULFATE 4 MG/ML
0.5 INJECTION, SOLUTION INTRAMUSCULAR; INTRAVENOUS EVERY 2 HOUR PRN
Status: DISCONTINUED | OUTPATIENT
Start: 2019-01-22 | End: 2019-01-23

## 2019-01-22 NOTE — PROGRESS NOTES
KAR[facility] HOSPITALIST  Progress Note     Vianne Sensor Patient Status:  Inpatient    9/3/1975 MRN [id number]   [facility] 4NW-A Attending Narciso Delarosa MD   Hosp Day # 1 PCP PHYSICIAN NONSTAFF     Chief Complaint: Abd pain     S: levETIRAcetam  500 mg Oral Daily       ASSESSMENT / PLAN:     1. Diverticulitis - sigmoid colon, recurrent   1. Cont abx, pain and nausea control   2. CLD  3. Surgery, plan for repeat imaging and possible OR if not improving   2.   Hx Seizures  1. keppra

## 2019-01-22 NOTE — PAYOR COMM NOTE
--------------  ADMISSION REVIEW     Payor: [address] #:  [id number]  Authorization Number: N/A    Admit date: 1/21/19  Admit time: 1326       Admitting Physician: Jefe Maldonado MD  Attending Physician:  Barrington Hendrix MD  Primary C No      Review of Systems    Positive for stated complaint: sudden onset of mid abd pain  nausea+  x 20 minutes  Other systems are as noted in HPI. Constitutional and vital signs reviewed.       All other systems reviewed and negative except as noted above limits   COMP METABOLIC PANEL (14) - Abnormal; Notable for the following components:    Glucose 133 (*)     Potassium 3.3 (*)     Calcium, Total 8.2 (*)     AST 46 (*)     Alt 80 (*)     A/G Ratio 0.9 (*)     All other components within normal limits   CBC enlargement, atrophy, abnormal density, or significant focal lesion. BILIARY:    No calcified stones. Mild dilatation of the common bile duct. Correlate clinically. No evidence of intra hepatic biliary tree dilatation. Renata Band   PANCREAS:  No lesion, fluid col Dictated by: Adriana Amato MD on 1/20/2019 at 23:38     Approved by: Adriana Amato MD              Medications   0.9%  NaCl infusion ( Intravenous Stopped 1/21/19 0209)   morphINE sulfate (PF) 4 MG/ML injection 6 mg (6 mg Intravenous Given 1/21/19 0036) 1/21/2019  2:50 AM            H&P - H&P Note      H&P signed by Samantha Wright MD at 1/21/2019  1:52 AM     Author:  Samantha Wright MD Service:  — Author Type:  Physician    Filed:  1/21/2019  1:52 AM Date of Service:  1/21/2019  1:09 AM Status:  Signed    Geraldo never smoked. she has never used smokeless tobacco. She reports that she does not drink alcohol or use drugs.     Family History:   Family History   Problem Relation Age of Onset   • No Known Problems Father    • No Known Problems Mother    • No Known Probl or organomegaly. Neurologic: No focal neurological deficits. CNII-XII grossly intact. Musculoskeletal: Moves all extremities. Extremities: No edema or cyanosis. Integument: No rashes or lesions. Psychiatric: Appropriate mood and affect.       Diagnost mg     Date Action Dose Route User    1/21/2019 1726 Given 650 mg Oral Alina Mosley, RN      docusate sodium (COLACE) cap 100 mg     Date Action Dose Route User    1/22/2019 0819 Given 100 mg Oral Hastings FLOR[place] Santos    1/21/2019 2039 Given 100 m tab 40 mEq     Date Action Dose Route User    1/22/2019 0047 Given 40 mEq Oral Jonas Alfaro, [facility]    1/21/2019 2147 Given 40 mEq Oral Jonas Alfaro, RN      0.9%  NaCl infusion     Date Action Dose Route User    1/21/2019 1640 New Bag 125 mL/hr Jasson Queen

## 2019-01-22 NOTE — PROGRESS NOTES
[facility]  Progress Note    Magalis Vaughan Patient Status:  Inpatient    9/3/1975 MRN [id number]   [facility] 4NW-A Attending Annmarie Ndiaye MD   Hosp Day # 1 PCP PHYSICIAN NONSTAFF     Subjective:   The patient states that anxiety and depressed mood     Nontraumatic intracerebral hemorrhage (HCC)     Hypokalemia     Anemia     Hyperglycemia     Acute diverticulitis    Recurrent diverticulitis    Plan:  1.  The patient's pain appears to not be resolving despite antibiotics at

## 2019-01-22 NOTE — PLAN OF CARE
GASTROINTESTINAL - ADULT    • Minimal or absence of nausea and vomiting Not Progressing        METABOLIC/FLUID AND ELECTROLYTES - ADULT    • Electrolytes maintained within normal limits Not Progressing          PAIN - ADULT    • Verbalizes/displays adequat

## 2019-01-23 ENCOUNTER — ANESTHESIA (OUTPATIENT)
Dept: SURGERY | Facility: HOSPITAL | Age: 44
DRG: 330 | End: 2019-01-23
Payer: MEDICAID

## 2019-01-23 ENCOUNTER — ANESTHESIA EVENT (OUTPATIENT)
Dept: SURGERY | Facility: HOSPITAL | Age: 44
DRG: 330 | End: 2019-01-23
Payer: MEDICAID

## 2019-01-23 ENCOUNTER — APPOINTMENT (OUTPATIENT)
Dept: CT IMAGING | Facility: HOSPITAL | Age: 44
DRG: 330 | End: 2019-01-23
Attending: SURGERY
Payer: MEDICAID

## 2019-01-23 LAB
ANION GAP SERPL CALC-SCNC: 6 MMOL/L (ref 0–18)
BASOPHILS # BLD AUTO: 0.01 X10(3) UL (ref 0–0.1)
BASOPHILS NFR BLD AUTO: 0.1 %
BUN BLD-MCNC: 4 MG/DL (ref 8–20)
BUN/CREAT SERPL: 11.1 (ref 10–20)
CALCIUM BLD-MCNC: 7.7 MG/DL (ref 8.3–10.3)
CHLORIDE SERPL-SCNC: 109 MMOL/L (ref 101–111)
CO2 SERPL-SCNC: 23 MMOL/L (ref 22–32)
CREAT BLD-MCNC: 0.36 MG/DL (ref 0.55–1.02)
EOSINOPHIL # BLD AUTO: 0.06 X10(3) UL (ref 0–0.3)
EOSINOPHIL NFR BLD AUTO: 0.6 %
ERYTHROCYTE [DISTWIDTH] IN BLOOD BY AUTOMATED COUNT: 13.9 % (ref 11.5–16)
GLUCOSE BLD-MCNC: 116 MG/DL (ref 70–99)
GLUCOSE BLD-MCNC: 135 MG/DL (ref 65–99)
HAV IGM SER QL: 2 MG/DL (ref 1.8–2.5)
HCT VFR BLD AUTO: 25.2 % (ref 34–50)
HGB BLD-MCNC: 8.3 G/DL (ref 12–16)
IMMATURE GRANULOCYTE COUNT: 0.05 X10(3) UL (ref 0–1)
IMMATURE GRANULOCYTE RATIO %: 0.5 %
LYMPHOCYTES # BLD AUTO: 0.85 X10(3) UL (ref 0.9–4)
LYMPHOCYTES NFR BLD AUTO: 7.9 %
MCH RBC QN AUTO: 27.3 PG (ref 27–33.2)
MCHC RBC AUTO-ENTMCNC: 32.9 G/DL (ref 31–37)
MCV RBC AUTO: 82.9 FL (ref 81–100)
MONOCYTES # BLD AUTO: 0.65 X10(3) UL (ref 0.1–1)
MONOCYTES NFR BLD AUTO: 6 %
NEUTROPHIL ABS PRELIM: 9.19 X10 (3) UL (ref 1.3–6.7)
NEUTROPHILS # BLD AUTO: 9.19 X10(3) UL (ref 1.3–6.7)
NEUTROPHILS NFR BLD AUTO: 84.9 %
OSMOLALITY SERPL CALC.SUM OF ELEC: 284 MOSM/KG (ref 275–295)
PLATELET # BLD AUTO: 230 10(3)UL (ref 150–450)
POTASSIUM SERPL-SCNC: 3.8 MMOL/L (ref 3.6–5.1)
RBC # BLD AUTO: 3.04 X10(6)UL (ref 3.8–5.1)
RED CELL DISTRIBUTION WIDTH-SD: 41.4 FL (ref 35.1–46.3)
SODIUM SERPL-SCNC: 138 MMOL/L (ref 136–144)
WBC # BLD AUTO: 10.8 X10(3) UL (ref 4–13)

## 2019-01-23 PROCEDURE — 99232 SBSQ HOSP IP/OBS MODERATE 35: CPT | Performed by: SURGERY

## 2019-01-23 PROCEDURE — 0DTN0ZZ RESECTION OF SIGMOID COLON, OPEN APPROACH: ICD-10-PCS | Performed by: SURGERY

## 2019-01-23 PROCEDURE — 0D1M0Z4 BYPASS DESCENDING COLON TO CUTANEOUS, OPEN APPROACH: ICD-10-PCS | Performed by: SURGERY

## 2019-01-23 PROCEDURE — 99233 SBSQ HOSP IP/OBS HIGH 50: CPT | Performed by: HOSPITALIST

## 2019-01-23 PROCEDURE — 74177 CT ABD & PELVIS W/CONTRAST: CPT | Performed by: SURGERY

## 2019-01-23 RX ORDER — MEPERIDINE HYDROCHLORIDE 25 MG/ML
12.5 INJECTION INTRAMUSCULAR; INTRAVENOUS; SUBCUTANEOUS AS NEEDED
Status: DISCONTINUED | OUTPATIENT
Start: 2019-01-23 | End: 2019-01-23 | Stop reason: HOSPADM

## 2019-01-23 RX ORDER — DOCUSATE SODIUM 100 MG/1
100 CAPSULE, LIQUID FILLED ORAL 2 TIMES DAILY PRN
Status: DISCONTINUED | OUTPATIENT
Start: 2019-01-23 | End: 2019-01-28

## 2019-01-23 RX ORDER — ACETAMINOPHEN 10 MG/ML
1000 INJECTION, SOLUTION INTRAVENOUS ONCE
Status: DISCONTINUED | OUTPATIENT
Start: 2019-01-23 | End: 2019-01-23 | Stop reason: HOSPADM

## 2019-01-23 RX ORDER — FAMOTIDINE 20 MG/1
20 TABLET ORAL 2 TIMES DAILY
Status: DISCONTINUED | OUTPATIENT
Start: 2019-01-23 | End: 2019-01-28

## 2019-01-23 RX ORDER — HEPARIN SODIUM 5000 [USP'U]/ML
5000 INJECTION, SOLUTION INTRAVENOUS; SUBCUTANEOUS EVERY 8 HOURS SCHEDULED
Status: DISCONTINUED | OUTPATIENT
Start: 2019-01-24 | End: 2019-01-24

## 2019-01-23 RX ORDER — SODIUM CHLORIDE, SODIUM LACTATE, POTASSIUM CHLORIDE, CALCIUM CHLORIDE 600; 310; 30; 20 MG/100ML; MG/100ML; MG/100ML; MG/100ML
INJECTION, SOLUTION INTRAVENOUS CONTINUOUS
Status: DISCONTINUED | OUTPATIENT
Start: 2019-01-23 | End: 2019-01-23 | Stop reason: HOSPADM

## 2019-01-23 RX ORDER — HYDROMORPHONE HYDROCHLORIDE 1 MG/ML
0.8 INJECTION, SOLUTION INTRAMUSCULAR; INTRAVENOUS; SUBCUTANEOUS EVERY 2 HOUR PRN
Status: DISCONTINUED | OUTPATIENT
Start: 2019-01-23 | End: 2019-01-26

## 2019-01-23 RX ORDER — SODIUM CHLORIDE, SODIUM LACTATE, POTASSIUM CHLORIDE, CALCIUM CHLORIDE 600; 310; 30; 20 MG/100ML; MG/100ML; MG/100ML; MG/100ML
INJECTION, SOLUTION INTRAVENOUS CONTINUOUS
Status: DISCONTINUED | OUTPATIENT
Start: 2019-01-23 | End: 2019-01-27

## 2019-01-23 RX ORDER — HYDROMORPHONE HYDROCHLORIDE 1 MG/ML
0.2 INJECTION, SOLUTION INTRAMUSCULAR; INTRAVENOUS; SUBCUTANEOUS EVERY 2 HOUR PRN
Status: DISCONTINUED | OUTPATIENT
Start: 2019-01-23 | End: 2019-01-28

## 2019-01-23 RX ORDER — DIPHENHYDRAMINE HYDROCHLORIDE 50 MG/ML
12.5 INJECTION INTRAMUSCULAR; INTRAVENOUS AS NEEDED
Status: DISCONTINUED | OUTPATIENT
Start: 2019-01-23 | End: 2019-01-23 | Stop reason: HOSPADM

## 2019-01-23 RX ORDER — MIDAZOLAM HYDROCHLORIDE 1 MG/ML
1 INJECTION INTRAMUSCULAR; INTRAVENOUS EVERY 5 MIN PRN
Status: DISCONTINUED | OUTPATIENT
Start: 2019-01-23 | End: 2019-01-23 | Stop reason: HOSPADM

## 2019-01-23 RX ORDER — HYDROMORPHONE HYDROCHLORIDE 1 MG/ML
0.4 INJECTION, SOLUTION INTRAMUSCULAR; INTRAVENOUS; SUBCUTANEOUS EVERY 5 MIN PRN
Status: DISCONTINUED | OUTPATIENT
Start: 2019-01-23 | End: 2019-01-23 | Stop reason: HOSPADM

## 2019-01-23 RX ORDER — MAGNESIUM OXIDE 400 MG (241.3 MG MAGNESIUM) TABLET
400 TABLET DAILY
Status: DISCONTINUED | OUTPATIENT
Start: 2019-01-23 | End: 2019-01-28

## 2019-01-23 RX ORDER — ACETAMINOPHEN 500 MG
1000 TABLET ORAL EVERY 8 HOURS
Status: DISCONTINUED | OUTPATIENT
Start: 2019-01-23 | End: 2019-01-26

## 2019-01-23 RX ORDER — HYDROMORPHONE HYDROCHLORIDE 1 MG/ML
0.4 INJECTION, SOLUTION INTRAMUSCULAR; INTRAVENOUS; SUBCUTANEOUS EVERY 2 HOUR PRN
Status: DISCONTINUED | OUTPATIENT
Start: 2019-01-23 | End: 2019-01-28

## 2019-01-23 RX ORDER — NALOXONE HYDROCHLORIDE 0.4 MG/ML
80 INJECTION, SOLUTION INTRAMUSCULAR; INTRAVENOUS; SUBCUTANEOUS AS NEEDED
Status: DISCONTINUED | OUTPATIENT
Start: 2019-01-23 | End: 2019-01-23 | Stop reason: HOSPADM

## 2019-01-23 RX ORDER — POTASSIUM CHLORIDE 20 MEQ/1
40 TABLET, EXTENDED RELEASE ORAL ONCE
Status: COMPLETED | OUTPATIENT
Start: 2019-01-23 | End: 2019-01-23

## 2019-01-23 RX ORDER — ONDANSETRON 2 MG/ML
4 INJECTION INTRAMUSCULAR; INTRAVENOUS EVERY 4 HOURS PRN
Status: DISCONTINUED | OUTPATIENT
Start: 2019-01-23 | End: 2019-01-28

## 2019-01-23 RX ORDER — POLYETHYLENE GLYCOL 3350 17 G/17G
17 POWDER, FOR SOLUTION ORAL DAILY PRN
Status: DISCONTINUED | OUTPATIENT
Start: 2019-01-23 | End: 2019-01-28

## 2019-01-23 RX ORDER — GABAPENTIN 300 MG/1
300 CAPSULE ORAL NIGHTLY
Status: DISCONTINUED | OUTPATIENT
Start: 2019-01-23 | End: 2019-01-28

## 2019-01-23 RX ORDER — FAMOTIDINE 10 MG/ML
20 INJECTION, SOLUTION INTRAVENOUS 2 TIMES DAILY
Status: DISCONTINUED | OUTPATIENT
Start: 2019-01-23 | End: 2019-01-28

## 2019-01-23 RX ORDER — KETOROLAC TROMETHAMINE 30 MG/ML
30 INJECTION, SOLUTION INTRAMUSCULAR; INTRAVENOUS EVERY 6 HOURS
Status: COMPLETED | OUTPATIENT
Start: 2019-01-23 | End: 2019-01-24

## 2019-01-23 RX ORDER — HYDROMORPHONE HYDROCHLORIDE 1 MG/ML
INJECTION, SOLUTION INTRAMUSCULAR; INTRAVENOUS; SUBCUTANEOUS
Status: COMPLETED
Start: 2019-01-23 | End: 2019-01-23

## 2019-01-23 RX ORDER — ONDANSETRON 2 MG/ML
4 INJECTION INTRAMUSCULAR; INTRAVENOUS AS NEEDED
Status: DISCONTINUED | OUTPATIENT
Start: 2019-01-23 | End: 2019-01-23 | Stop reason: HOSPADM

## 2019-01-23 NOTE — PROGRESS NOTES
[facility]  Progress Note    [facility] Patient Status:  Inpatient    9/3/1975 MRN [id number]   [facility] 4NW-A Attending Flakita Guzman MD   Hosp Day # 2 PCP PHYSICIAN NONSTAFF     Subjective:  Patient seen after return [employer]) NRDR ([address]) which includes the Dose Index Registry.      PATIENT STATED HISTORY:(As transcribed by Technologist)  Patient complains of history of diverticulitis with middle abdomen pelvis       CONTRAST USED List:     Normal pregnancy     IUGR (intrauterine growth restriction) in prior pregnancy, pregnant     Eclampsia     Oligohydramnios, antepartum     31 weeks gestation of pregnancy     H/O:      Seizure ([facility] [address]     SAH (subarachnoid hemorrhage) ([facility] [address]

## 2019-01-23 NOTE — PLAN OF CARE
Resting in bed,still with lots of pain in the abdomen,grimacing & crying in pain. No SOB. Assisted to the bathroom. Voiding with no difficulty. Remains on IV antibiotics. No adverse reactions noted. NPO maintained for repeat CT. Blood thinners held this am for po

## 2019-01-23 NOTE — PLAN OF CARE
Patient maintained NPO for planned surgery. COnsent for surgery obtained & placed in paper chart. Patient/spouse aware of plans. Morphine IV 0.52mg given as requested for pain,tylenol 650mg as well for slightly elevated temp. Tolerating IVF & IV antibiotics.

## 2019-01-23 NOTE — PLAN OF CARE
Back from repeat CT,maintained NPO. Informed Daisysurgery PA of result. She will talk to Dr Ivonne Bhakta about Bryant Mendoza instructions to keep patient NPO.

## 2019-01-23 NOTE — PROGRESS NOTES
[facility]  Progress Note     Patricia Robins Patient Status:  Inpatient    9/3/1975 MRN [id number]   [facility] 4NW-A Attending Stephanie Leary MD   Hosp Day # 2 PCP PHYSICIAN NONSTAFF     Chief Complaint: Abd pain     S: input(s): TROP, CK in the last 168 hours. Imaging: Imaging data reviewed in Epic.     Medications:   • Heparin Sodium (Porcine)  5,000 Units Subcutaneous [facility]   • levofloxacin  750 mg Intravenous Q24H   • metRONIDAZOLE  500 mg Intravenous Q8H   •

## 2019-01-24 LAB
ANION GAP SERPL CALC-SCNC: 5 MMOL/L (ref 0–18)
BUN BLD-MCNC: 5 MG/DL (ref 8–20)
BUN/CREAT SERPL: 12.5 (ref 10–20)
CALCIUM BLD-MCNC: 7.9 MG/DL (ref 8.3–10.3)
CHLORIDE SERPL-SCNC: 109 MMOL/L (ref 101–111)
CO2 SERPL-SCNC: 25 MMOL/L (ref 22–32)
CREAT BLD-MCNC: 0.4 MG/DL (ref 0.55–1.02)
GLUCOSE BLD-MCNC: 138 MG/DL (ref 70–99)
HAV IGM SER QL: 2.2 MG/DL (ref 1.8–2.5)
OSMOLALITY SERPL CALC.SUM OF ELEC: 287 MOSM/KG (ref 275–295)
PHOSPHATE SERPL-MCNC: 3.1 MG/DL (ref 2.5–4.9)
POTASSIUM SERPL-SCNC: 3.5 MMOL/L (ref 3.6–5.1)
POTASSIUM SERPL-SCNC: 3.9 MMOL/L (ref 3.6–5.1)
SODIUM SERPL-SCNC: 139 MMOL/L (ref 136–144)

## 2019-01-24 PROCEDURE — 99233 SBSQ HOSP IP/OBS HIGH 50: CPT | Performed by: HOSPITALIST

## 2019-01-24 RX ORDER — ASPIRIN 325 MG
325 TABLET ORAL DAILY
Status: DISCONTINUED | OUTPATIENT
Start: 2019-01-24 | End: 2019-01-28

## 2019-01-24 RX ORDER — HEPARIN SODIUM 5000 [USP'U]/ML
5000 INJECTION, SOLUTION INTRAVENOUS; SUBCUTANEOUS EVERY 12 HOURS SCHEDULED
Status: DISCONTINUED | OUTPATIENT
Start: 2019-01-24 | End: 2019-01-28

## 2019-01-24 RX ORDER — LEVOFLOXACIN 5 MG/ML
750 INJECTION, SOLUTION INTRAVENOUS EVERY 24 HOURS
Status: DISCONTINUED | OUTPATIENT
Start: 2019-01-24 | End: 2019-01-26

## 2019-01-24 RX ORDER — METRONIDAZOLE 500 MG/100ML
500 INJECTION, SOLUTION INTRAVENOUS EVERY 8 HOURS
Status: DISCONTINUED | OUTPATIENT
Start: 2019-01-24 | End: 2019-01-26

## 2019-01-24 RX ORDER — POTASSIUM CHLORIDE 20 MEQ/1
40 TABLET, EXTENDED RELEASE ORAL EVERY 4 HOURS
Status: COMPLETED | OUTPATIENT
Start: 2019-01-24 | End: 2019-01-24

## 2019-01-24 NOTE — PHYSICAL THERAPY NOTE
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     BALANCE  Static Sitting: Fair +  Dynamic Sitting: Fair  Static Standing: Fair -  Dynamic Standing: Poor -    ADDITIONAL TESTS  Additional Tests: Elderly Mobility Scale     Elderly Mobility Scal Patient is a 44year old female admitted on 1/20/2019 for abdominal pain.   Pertinent comorbidities and personal factors impacting therapy include H/o seizures, post partum depression, h/o diverticulitis, SAH seizures at 31 wks pregnant, status post emerg to/from Stand at assistance level: independent     Goal #3 Patient is able to ambulate 150 feet with assist device: none at assistance level: supervision     Goal #4    Goal #5    Goal #6    Goal Comments: Goals established on 1/24/2019

## 2019-01-24 NOTE — OPERATIVE REPORT
[facility]    PATIENT'S NAME: Travis Tavarez   ATTENDING PHYSICIAN: Lalitha Sutton M.D.    OPERATING PHYSICIAN: Althea Dykes D.O.   PATIENT ACCOUNT#:   [de-identified]    LOCATION:  PACU [facility] PACU 7 EDWP 10  MEDICAL RECORD #:   [id number]       DATE line of Toldt was incised to allow the sigmoid colon to be moved even further medially. A point was chosen proximal to the inflammatory response and a DIEGO 55 stapling device fired across the sigmoid colon at this location.   The mesosigmoid was then take The fascia was approximated using 2 short runs of #1 PDS suture. Skin edges approximated using sterile stapling device. Drain sutured into place using 3-0 nylon x1.   The colostomy was addressed and the staple line was amputated with handheld electrocaute

## 2019-01-24 NOTE — OCCUPATIONAL THERAPY NOTE
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     RANGE OF MOTION AND STRENGTH ASSESSMENT  Upper extremity ROM is within functional li addressed    ASSESSMENT     Patient is a 44year old female admitted on 1/20/2019 for colon resection with colostomy. Complete medical history and occupational profile noted above. Functional outcome measures completed include AMPAC, MMT, ROM.  In this OT e

## 2019-01-24 NOTE — PROGRESS NOTES
[facility]  Progress Note     Ginny [facility] Patient Status:  Inpatient    9/3/1975 MRN [id number]   [facility] 4NW-A Attending Gilberto Salguero MD   Hosp Day # 3 PCP PHYSICIAN NONSTAFF     Chief Complaint: Abd pain     S: 195.2 mL/min (A) (based on SCr of 0.4 mg/dL (L)). No results for input(s): PTP, INR in the last 168 hours. No results for input(s): TROP, CK in the last 168 hours. Imaging: Imaging data reviewed in Epic.     Medications:   • levofloxacin  750

## 2019-01-24 NOTE — PROGRESS NOTES
NURSING ADMISSION NOTE      Patient admitted via Cart  Oriented to room. Safety precautions initiated. Bed in low position. Call light in reach. RECEIVED PATIENT FROM RECOVERY ROOM . ALERT AND ORIENT X 4 , ON O2 2L/NC  , C POX , O2 SAT 98%.  DENIES AN

## 2019-01-24 NOTE — ANESTHESIA POSTPROCEDURE EVALUATION
[address] Patient Status:  Inpatient   Age/Gender 44year old female MRN [id number]   [facility] 3NW-A Attending Angelika Howell MD   [address] Day # 2 PCP PHYSICIAN NONSTAFF       Anesthesia Post-op Note    Procedur

## 2019-01-24 NOTE — PROGRESS NOTES
[facility]  Progress Note    Nicole Escobar Patient Status:  Inpatient    9/3/1975 MRN [id number]   [facility] 3NW-A Attending Curtis Gastelum MD   Hosp Day # 3 PCP PHYSICIAN NONSTAFF     Subjective:  Patient states she feels m Oligohydramnios, antepartum     31 weeks gestation of pregnancy     H/O:      Seizure ([facility] [address]     SAH (subarachnoid hemorrhage) (HCC)     IVH (intraventricular hemorrhage) (HCC)     Adjustment disorder with mixed anxiety and depressed mood     Nontr

## 2019-01-25 PROCEDURE — 99233 SBSQ HOSP IP/OBS HIGH 50: CPT | Performed by: HOSPITALIST

## 2019-01-25 NOTE — HOME CARE LIAISON
Referral received from Saad  Patient was discussed in discharge rounds to have out of state Medicaid without home health benefits, asked to see if we can offer [address] home health    Will need signing MD  Patient will need issue of supplies addressed, to michael

## 2019-01-25 NOTE — PROGRESS NOTES
[facility]  Progress Note    Krista Cheney Patient Status:  Inpatient    9/3/1975 MRN [id number]   [facility] 3NW-A Attending Sarah Trotter MD   Hosp Day # 4 PCP PHYSICIAN NONSTAFF     Subjective:  No new complaints.  Pain co ([facility] [address]     Hypokalemia     Anemia     Hyperglycemia     Acute diverticulitis      POD 2 Jose J's procedure  Expected Ileus has not yet resolved    Plan:  1. Continue clear liquids today until bowel function improves  2.  Cont IV fluids, antibiotics/antieme

## 2019-01-25 NOTE — HOME CARE LIAISON
Met with patient at bedside. Discussed possible Residential RN visit pending verifying out of state insurance benefits. Patient will be staying with daughter at [address]   Explained to patient to make sure patient makes appointme

## 2019-01-25 NOTE — OCCUPATIONAL THERAPY NOTE
OCCUPATIONAL THERAPY TREATMENT NOTE - INPATIENT     Room Number: 320/320-A  Session: 1   Number of Visits to Meet Established Goals: 5    Presenting Problem: colon resection with colostomy    History related to current admission: Pt is 44year old female a section 11/1 for severe preeclampsia    Problem List  Principal Problem:    Acute diverticulitis  Active Problems:    Hypokalemia    Anemia    Hyperglycemia      Past Medical History  Past Medical History:   Diagnosis Date   • Aneurysm ([facility] [address]    • Seizure d pt with nystagmus but with recent sinus infection, pt educated on gaze stabilization to help with dizziness, pt was able to amb x1 in room from chair to commode with supervision with no AD, therapist assist pt to complete mobility and toileting at commode

## 2019-01-25 NOTE — CM/SW NOTE
Spoke with [address] earlier today -he notes that there is no way to tell if [place] med[place] application made but pt would have to cancel her [facility] before applying here IF she is going to stay in [place].  Liaison does not recommend cancelling her [address]

## 2019-01-25 NOTE — CONSULTS
[facility]  Report of Inpatient Ostomy Consultation    Patricia Robins Patient Status:  Inpatient    9/3/1975 MRN [id number]   [facility] 3NW-A Attending Angelika Howell MD     History of Present Illness:  Patricia Robins

## 2019-01-25 NOTE — PROGRESS NOTES
[facility]  Progress Note     Billbong Mckee Patient Status:  Inpatient    9/3/1975 MRN [id number]   [facility] 4NW-A Attending Ravi Jimenez MD   Hosp Day # 4 PCP PHYSICIAN NONSTAFF     Chief Complaint: Abd pain     S: results for input(s): PTP, INR in the last 168 hours. No results for input(s): TROP, CK in the last 168 hours. Imaging: Imaging data reviewed in Epic.     Medications:   • levofloxacin  750 mg Intravenous Q24H   • metRONIDAZOLE  500 mg Intravenou

## 2019-01-25 NOTE — PLAN OF CARE
PT UP IN CHAIR. VERY TEARFUL, STATES SHE HAS INCREASED PAIN TO LT OF ABD PARESH WITH MOVEMENT. PAIN MEDS GIVEN. PT ALSO SATEL SHE FELT LIKE PASSING OUT WHILE AMBULATING G TO BATHROOM, VITALS WNL, WILL CONTINUE TO MONITOR.   ABD INCSION INTACT WITH AQUACEL DRES

## 2019-01-26 PROCEDURE — 99232 SBSQ HOSP IP/OBS MODERATE 35: CPT | Performed by: HOSPITALIST

## 2019-01-26 RX ORDER — HYDROCODONE BITARTRATE AND ACETAMINOPHEN 5; 325 MG/1; MG/1
1 TABLET ORAL EVERY 4 HOURS PRN
Status: DISCONTINUED | OUTPATIENT
Start: 2019-01-26 | End: 2019-01-27

## 2019-01-26 RX ORDER — ACETAMINOPHEN 500 MG
1000 TABLET ORAL EVERY 8 HOURS PRN
Status: DISCONTINUED | OUTPATIENT
Start: 2019-01-26 | End: 2019-01-28

## 2019-01-26 NOTE — PROGRESS NOTES
[facility]  Progress Note     Mauricio Darshan Patient Status:  Inpatient    9/3/1975 MRN [id number]   [facility] 4NW-A Attending Eleanor Celis MD   Hosp Day # 5 PCP PHYSICIAN NONSTAFF     Chief Complaint: Abd pain     S: hours. No results for input(s): TROP, CK in the last 168 hours. Imaging: Imaging data reviewed in Epic.     Medications:   • levofloxacin  750 mg Intravenous Q24H   • metRONIDAZOLE  500 mg Intravenous Q8H   • aspirin  325 mg Oral Daily   • Hepari

## 2019-01-26 NOTE — PROGRESS NOTES
01/25/19 6312   Clinical Encounter Type   Visited With Patient   Routine Visit Introduction   Continue Visiting No   Surgical Visit Post-op   Patient Spiritual Encounters   Spiritual Interventions  provided spiritual support through caring jannette

## 2019-01-26 NOTE — PROGRESS NOTES
[facility]  Progress Note    Krissy Huggins Patient Status:  Inpatient    9/3/1975 MRN [id number]   [facility] 3NW-A Attending Eddie Prado MD   Hosp Day # 5 PCP PHYSICIAN NONSTAFF     Subjective:  Pts pain is improving   Os minutes face to face with the patient. More than 50% of that time was spent counseling the patient and/or on coordination of care. The diagnosis, prognosis, and general treatment was explained to the patient and the family.         Yanelis Gonsalez DO  [facility]

## 2019-01-26 NOTE — PROGRESS NOTES
PATIENT HAS IV FLUIDS AT [facility], IV ANTIBIOTICS CHANGED FROM LEVAQUIN AND FLAGYL TO ZOSYN, TOLERATING A FULL LIQUID DIET, COLOSTOMY HAS STARTED PUTTING OUT A SMALL AMOUNT OF BROWN STOOL AND PASSING LARGE AMOUNTS OF FLATUS, ON SEIZURE PRECAUTIONS, ON ROOM AIR,

## 2019-01-26 NOTE — PROGRESS NOTES
Writer assisted pt back  to chair after using commode. Writer was elevating foot of chair in recline position when gloved hand slipped off handle bringing patient legs up quickly to recline position. Pt had an increase in abd pain as she \"tightened up\".

## 2019-01-26 NOTE — PLAN OF CARE
GASTROINTESTINAL - ADULT    • Minimal or absence of nausea and vomiting Progressing        Impaired Activities of Daily Living    • Achieve highest/safest level of independence in self care Progressing        PAIN - ADULT    • Verbalizes/displays adequate

## 2019-01-27 LAB
ANION GAP SERPL CALC-SCNC: 6 MMOL/L (ref 0–18)
BASOPHILS # BLD AUTO: 0.03 X10(3) UL (ref 0–0.1)
BASOPHILS NFR BLD AUTO: 0.3 %
BUN BLD-MCNC: 6 MG/DL (ref 8–20)
BUN/CREAT SERPL: 12 (ref 10–20)
CALCIUM BLD-MCNC: 8.4 MG/DL (ref 8.3–10.3)
CHLORIDE SERPL-SCNC: 107 MMOL/L (ref 101–111)
CO2 SERPL-SCNC: 29 MMOL/L (ref 22–32)
CREAT BLD-MCNC: 0.5 MG/DL (ref 0.55–1.02)
EOSINOPHIL # BLD AUTO: 0.31 X10(3) UL (ref 0–0.3)
EOSINOPHIL NFR BLD AUTO: 3.3 %
ERYTHROCYTE [DISTWIDTH] IN BLOOD BY AUTOMATED COUNT: 14.6 % (ref 11.5–16)
GLUCOSE BLD-MCNC: 103 MG/DL (ref 70–99)
HCT VFR BLD AUTO: 27.7 % (ref 34–50)
HGB BLD-MCNC: 8.5 G/DL (ref 12–16)
IMMATURE GRANULOCYTE COUNT: 0.39 X10(3) UL (ref 0–1)
IMMATURE GRANULOCYTE RATIO %: 4.2 %
LYMPHOCYTES # BLD AUTO: 1.35 X10(3) UL (ref 0.9–4)
LYMPHOCYTES NFR BLD AUTO: 14.4 %
MCH RBC QN AUTO: 25.7 PG (ref 27–33.2)
MCHC RBC AUTO-ENTMCNC: 30.7 G/DL (ref 31–37)
MCV RBC AUTO: 83.7 FL (ref 81–100)
MONOCYTES # BLD AUTO: 0.62 X10(3) UL (ref 0.1–1)
MONOCYTES NFR BLD AUTO: 6.6 %
NEUTROPHIL ABS PRELIM: 6.66 X10 (3) UL (ref 1.3–6.7)
NEUTROPHILS # BLD AUTO: 6.66 X10(3) UL (ref 1.3–6.7)
NEUTROPHILS NFR BLD AUTO: 71.2 %
OSMOLALITY SERPL CALC.SUM OF ELEC: 292 MOSM/KG (ref 275–295)
PLATELET # BLD AUTO: 370 10(3)UL (ref 150–450)
POTASSIUM SERPL-SCNC: 3.6 MMOL/L (ref 3.6–5.1)
POTASSIUM SERPL-SCNC: 4.3 MMOL/L (ref 3.6–5.1)
RBC # BLD AUTO: 3.31 X10(6)UL (ref 3.8–5.1)
RED CELL DISTRIBUTION WIDTH-SD: 45 FL (ref 35.1–46.3)
SODIUM SERPL-SCNC: 142 MMOL/L (ref 136–144)
WBC # BLD AUTO: 9.4 X10(3) UL (ref 4–13)

## 2019-01-27 PROCEDURE — 99232 SBSQ HOSP IP/OBS MODERATE 35: CPT | Performed by: HOSPITALIST

## 2019-01-27 RX ORDER — HYDROCODONE BITARTRATE AND ACETAMINOPHEN 10; 325 MG/1; MG/1
1 TABLET ORAL EVERY 4 HOURS PRN
Status: DISCONTINUED | OUTPATIENT
Start: 2019-01-27 | End: 2019-01-28

## 2019-01-27 RX ORDER — IBUPROFEN 400 MG/1
400 TABLET ORAL EVERY 4 HOURS PRN
Status: DISCONTINUED | OUTPATIENT
Start: 2019-01-27 | End: 2019-01-27

## 2019-01-27 RX ORDER — TRAMADOL HYDROCHLORIDE 50 MG/1
50 TABLET ORAL EVERY 6 HOURS PRN
Status: DISCONTINUED | OUTPATIENT
Start: 2019-01-27 | End: 2019-01-28

## 2019-01-27 RX ORDER — POTASSIUM CHLORIDE 20 MEQ/1
40 TABLET, EXTENDED RELEASE ORAL EVERY 4 HOURS
Status: COMPLETED | OUTPATIENT
Start: 2019-01-27 | End: 2019-01-27

## 2019-01-27 NOTE — PROGRESS NOTES
[facility]  Progress Note     Kimberly Galdamez Patient Status:  Inpatient    9/3/1975 MRN [id number]   [facility] 4NW-A Attending Diana Corey MD   Hosp Day # 6 PCP PHYSICIAN NONSTAFF     Chief Complaint: Abd pain     S: input(s): PTP, INR in the last 168 hours. No results for input(s): TROP, CK in the last 168 hours. Imaging: Imaging data reviewed in Epic.     Medications:   • piperacillin-tazobactam  3.375 g Intravenous Q8H   • aspirin  325 mg Oral Daily   • He

## 2019-01-27 NOTE — PLAN OF CARE
GASTROINTESTINAL - ADULT    • Minimal or absence of nausea and vomiting Progressing    • Maintains or returns to baseline bowel function Progressing        Impaired Activities of Daily Living    • Achieve highest/safest level of independence in self care P avoid medication further on.     1425; Patient agreed to going to bathroom after RN encouraged her for almost an hour to get to the bathroom and clean up, while writer RN changed linens to chair.  IV pain medication administered and patient states the pain

## 2019-01-27 NOTE — PROGRESS NOTES
[facility]  Progress Note    Fred Parker Patient Status:  Inpatient    9/3/1975 MRN [id number]   [facility] 3NW-A Attending Padilla Sharif MD   Hosp Day # 6 PCP PHYSICIAN NONSTAFF     Subjective:  Pt with incisional pain.

## 2019-01-27 NOTE — PLAN OF CARE
GASTROINTESTINAL - ADULT    • Minimal or absence of nausea and vomiting Not Progressing    • Maintains or returns to baseline bowel function Not Progressing        Impaired Activities of Daily Living    • Achieve highest/safest level of independence in jim

## 2019-01-28 VITALS
OXYGEN SATURATION: 98 % | BODY MASS INDEX: 32.1 KG/M2 | WEIGHT: 148.81 LBS | HEIGHT: 57 IN | RESPIRATION RATE: 18 BRPM | HEART RATE: 80 BPM | DIASTOLIC BLOOD PRESSURE: 34 MMHG | SYSTOLIC BLOOD PRESSURE: 95 MMHG | TEMPERATURE: 98 F

## 2019-01-28 PROCEDURE — 99232 SBSQ HOSP IP/OBS MODERATE 35: CPT | Performed by: PHYSICIAN ASSISTANT

## 2019-01-28 PROCEDURE — 99239 HOSP IP/OBS DSCHRG MGMT >30: CPT | Performed by: HOSPITALIST

## 2019-01-28 RX ORDER — AMOXICILLIN AND CLAVULANATE POTASSIUM 875; 125 MG/1; MG/1
1 TABLET, FILM COATED ORAL 2 TIMES DAILY
Qty: 14 TABLET | Refills: 0 | Status: SHIPPED | OUTPATIENT
Start: 2019-01-28 | End: 2019-02-04

## 2019-01-28 RX ORDER — HYDROCODONE BITARTRATE AND ACETAMINOPHEN 10; 325 MG/1; MG/1
1 TABLET ORAL EVERY 6 HOURS PRN
Qty: 20 TABLET | Refills: 0 | Status: SHIPPED | OUTPATIENT
Start: 2019-01-28

## 2019-01-28 NOTE — PLAN OF CARE
GASTROINTESTINAL - ADULT    • Minimal or absence of nausea and vomiting Progressing        PAIN - ADULT    • Verbalizes/displays adequate comfort level or patient's stated pain goal Progressing        POD # 4.   Midline abdominal incision with staples intac

## 2019-01-28 NOTE — PROGRESS NOTES
[facility]  Progress Note     Katharina Joey Patient Status:  Inpatient    9/3/1975 MRN [id number]   [facility] 4NW-A Attending Awais Kwon MD   Hosp Day # 7 PCP PHYSICIAN NONSTAFF     Chief Complaint: Abd pain     S: (Porcine)  5,000 Units Subcutaneous 2 times per day   • gabapentin  300 mg Oral Nightly   • magnesium oxide  400 mg Oral Daily   • famoTIDine  20 mg Oral BID    Or   • famoTIDine  20 mg Intravenous BID   • levETIRAcetam  500 mg Oral Daily       ASSESSMENT

## 2019-01-28 NOTE — CM/SW NOTE
Pt not able to get a pro iram visit from [facility] due to having out of state medicaid. Per RN from [facility], pt will go home with new ostomy supplies and go to the [facility] for additional assistance if necessary. Pt will have help from her dtr.   Pt will d/c toda

## 2019-01-28 NOTE — PROGRESS NOTES
[facility]  Progress Note    Bill Mckee Patient Status:  Inpatient    9/3/1975 MRN [id number]   [facility] 3NW-A Attending Benedicto Victoria MD   Hosp Day # 7 PCP PHYSICIAN NONSTAFF     Subjective:  Pain controlled on [place] 1 Hypokalemia     Anemia     Hyperglycemia     Acute diverticulitis      POD 5 Jose J's procedure  Expected Ileus has resolved    Plan:  Home today. May remove drain. Do not lift more than 10 pounds.   Do not drive a car or return to work until your follo

## 2019-01-29 NOTE — CM/SW NOTE
01/29/19 0800   Discharge disposition   Expected discharge disposition Home or Self   Name of [address] [place] [address] services after discharge None   Discharge transportation Private car

## 2019-01-29 NOTE — WOUND PROGRESS NOTE
[facility]  Inpatient Ostomy Progress Note    Magalis Vaughan Patient Status:  Inpatient    9/3/1975 MRN [id number]   [facility] 3NW-A Attending No att. providers found   Days in hospital 7 [address] boxes of [address] as well as two piece 2 1/4inch flange and lock and roll pouches. Pt understood instructions and was able to perform pouch change. Pt states she is working on insurance and if needed will call ostomy clinic for appt.        Total ti

## 2019-02-01 NOTE — PROGRESS NOTES
Phoned pt, notified of path report per Dr. Kati Benitez. Verbalizing understanding.  Pt has fu appt Future Appointments    2/6/2019   2:30 PM    Marilia Arechiga DO         [facility]      [facility]

## 2019-02-04 NOTE — DISCHARGE SUMMARY
[facility]  DISCHARGE SUMMARY     Amie Mccarthy Patient Status:  Inpatient    9/3/1975 MRN [id number]   [facility] 3NW-A Attending No att. providers found   Hosp Day # 7 PCP PHYSICIAN NONSTAFF     Date of Admission:  discharge from the hospital.    Procedures during hospitalization:   Sigmoid colon resection with end colostomy, Jose J's procedure    Incidental or significant findings and recommendations (brief descriptions):  • As above    Lab/Test results pending at location directed by your doctor or nurse    Bring a paper prescription for each of these medications  · Amoxicillin-Pot Clavulanate 875-125 MG Tabs  · HYDROcodone-acetaminophen  MG Tabs         [place]    Follow-up appointment:   John Awad,

## 2019-02-06 ENCOUNTER — OFFICE VISIT (OUTPATIENT)
Dept: SURGERY | Facility: CLINIC | Age: 44
End: 2019-02-06
Payer: COMMERCIAL

## 2019-02-06 VITALS — HEART RATE: 84 BPM | SYSTOLIC BLOOD PRESSURE: 105 MMHG | DIASTOLIC BLOOD PRESSURE: 68 MMHG

## 2019-02-06 VITALS
SYSTOLIC BLOOD PRESSURE: 98 MMHG | DIASTOLIC BLOOD PRESSURE: 62 MMHG | TEMPERATURE: 97 F | HEIGHT: 55 IN | HEART RATE: 80 BPM | WEIGHT: 148 LBS | BODY MASS INDEX: 34.25 KG/M2

## 2019-02-06 DIAGNOSIS — I60.7 CEREBRAL ANEURYSM RUPTURE (HCC): Primary | ICD-10-CM

## 2019-02-06 DIAGNOSIS — I60.9 SAH (SUBARACHNOID HEMORRHAGE) (HCC): ICD-10-CM

## 2019-02-06 DIAGNOSIS — K57.92 ACUTE DIVERTICULITIS: Primary | ICD-10-CM

## 2019-02-06 PROCEDURE — 99211 OFF/OP EST MAY X REQ PHY/QHP: CPT | Performed by: NURSE PRACTITIONER

## 2019-02-06 PROCEDURE — 99024 POSTOP FOLLOW-UP VISIT: CPT | Performed by: SURGERY

## 2019-02-06 NOTE — PATIENT INSTRUCTIONS
1. Establish care in [place] with a Neuro Interventionalist as well as a neurologist.    2. Plan to repeat MRA Brain end of March / beginning of April 2019.    3. Continue taking Aspirin as prescribed. 4. Ok to travel now from our standpoint.       5. Plea the prescription. • Patient must present photo ID at time of . PLEASE NOTE: PRESCRIPTIONS MUST BE PICKED UP PRIOR TO 3:00PM MONDAY-THURSDAY AND PRIOR TO 1:00PM ON FRIDAY    Scheduling Tests:     If your physician has ordered radiology tests such a completion may require an additional fee. • A signed Release of Information (ARNEL) must be on file before forms may be submitted. When dropping off forms, please ask the  for this paper.    • Failure to follow above steps may result in the del

## 2019-02-06 NOTE — PROGRESS NOTES
Post Operative Visit Note       Active Problems  No diagnosis found. Chief Complaint   Patient presents with:  Post-Op: Post op, 1/23/19 Sigmoid colon resection with end colostomy, Jose J's procedure. Denies any real pain. Patient is on a soft diet. Drug use: No    Other Topics      Concerns:        Caffeine Concern: Yes          coffee and soda        Exercise: Yes          walking       Current Outpatient Medications:   •  levETIRAcetam 500 MG Oral Tab, Take 1 tablet (500 mg total) by mouth 2 (two) 01/17/2019   BMI 34.40 kg/m²   Physical Exam     General Pleasant female in no acute distress. Eyes. No scleral ictarus. Conjunctivae moist.  Pupils equal.  ENT. Moist mucous membranes. No intra oral lesions. Trachea midline. Cardiovascular.   Regula

## 2019-02-06 NOTE — PROGRESS NOTES
Review of Systems:    Hand Dominance: right  General: no symptoms reported  Neuro: no symptoms reported  Head: no symptoms reported  Musculoskeletal: no symptoms reported  Cardiovascular: no symptoms reported  Gastrointestinal: Recent colon surgery  Genito

## 2019-02-06 NOTE — PROGRESS NOTES
[facility]  Interventional Neuroradiology Progress Note    Melissa Patel     9/3/1975 MRN [id number]   Location [facility], [address] [facility] PCP      Subjective:   We had the pleasure of seeing Melissa Patel, denies upper/lower extremity weakness or paresthesias. The patient denies dizziness, imbalance, falls, difficulty with ambulation, coordination, dysarthria, or speech expression/comprehension.      The patient denies constitutional symptoms, such as fevers care at home    We counseled the patient on intracranial aneurysm/cerebrovascular risk factor reduction for ischemic/hemorrhagic stroke prevention.  We recommend annual follow up with a primary provider to screen for hypertension, diabetes, and hyperlipidem

## 2021-04-10 ENCOUNTER — HOSPITAL ENCOUNTER (EMERGENCY)
Dept: HOSPITAL 90 - EDH | Age: 46
LOS: 1 days | Discharge: HOME | End: 2021-04-11
Payer: MEDICAID
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
## 2021-04-10 DIAGNOSIS — S39.012A: Primary | ICD-10-CM
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
## 2021-04-10 DIAGNOSIS — G44.209: ICD-10-CM
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
## 2021-04-10 DIAGNOSIS — M54.2: ICD-10-CM

## 2021-04-10 DIAGNOSIS — I10: ICD-10-CM
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
## 2021-04-10 PROCEDURE — 72125 CT NECK SPINE W/O DYE: CPT

## 2021-04-10 PROCEDURE — 70450 CT HEAD/BRAIN W/O DYE: CPT

## 2025-05-16 NOTE — ANESTHESIA PREPROCEDURE EVALUATION
Dental Heme/Onc Intervent Pulmonology Intervent Radiology MICU MICU MICU MICU MICU MICU MICU MICU MICU Neurology Neurology Neurology Neurology PRE-OP EVALUATION    Patient Name: Vianne Sensor    Pre-op Diagnosis: Diverticulitis [K57.92]    Procedure(s):  LOW ANTERIOR COLON RESECTION WITH COLOSTOMY PLACEMENT    Surgeon(s) and Role:     Sea Hughes, DO - Primary    Pre-op vitals Eliot Franco Palliative Care Pulmonology Pulmonology Pulmonology Pulmonology Pulmonology Surgery Surgery Surgery Urology Urology Urology 10 mg Intravenous Q8H PRN   LevoFLOXacin in D5W (LEVAQUIN) IVPB premix 750 mg 750 mg Intravenous Q24H   metRONIDAZOLE in NaCl (FLAGYL) 5 mg/ml IVPB premix 500 mg 500 mg Intravenous Q8H   [COMPLETED] potassium chloride 40 mEq in sodium chloride 0.9 % 250 mL Dental Gastroenterology Heme/Onc Heme/Onc Hospitalist MICU MICU MICU MICU MICU MICU MICU MICU MICU Nephrology Nephrology Nephrology Nephrology Nephrology Nephrology Nephrology Neurology Pulmonology Pulmonology Pulmonology Pulmonology Pulmonology Urology 01/23/2019    HGB 8.3 (L) 01/23/2019    HCT 25.2 (L) 01/23/2019    MCV 82.9 01/23/2019    MCH 27.3 01/23/2019    MCHC 32.9 01/23/2019    RDW 13.9 01/23/2019    .0 01/23/2019     Lab Results   Component Value Date     01/23/2019    K 3.8 01/23/ Gastroenterology Heme/Onc Heme/Onc Heme/Onc MICU MICU MICU MICU MICU Nephrology Nephrology Neurology Pulmonology Pulmonology Pulmonology Pulmonology Pulmonology Pulmonology Pulmonology Pulmonology Pulmonology Surgery Surgery Urology Urology Urology Heme/Onc Heme/Onc Heme/Onc Intervent Pulmonology MICU MICU MICU MICU MICU Nephrology Nephrology Nephrology Nephrology Pulmonology Pulmonology Pulmonology Pulmonology Pulmonology Pulmonology Surgery Urology Vascular Surgery Nephrology Nephrology Nephrology Nephrology Nephrology Nephrology Nephrology Pulmonology Pulmonology Pulmonology Urology Urology Urology Intervent Pulmonology Nephrology Nephrology Nephrology Urology Urology Intervent Pulmonology Nephrology Nephrology Hospitalist Pulmonology Nephrology Palliative Care Hospitalist Hospitalist Palliative Care Palliative Care

## (undated) DEVICE — CHLORAPREP 26ML APPLICATOR

## (undated) DEVICE — DRAIN CHANNEL 19FR BLAKE

## (undated) DEVICE — GOWN,SIRUS,FABRIC-REINFORCED,X-LARGE: Brand: MEDLINE

## (undated) DEVICE — DRESSING AQUACEL AG 3.5 X 10

## (undated) DEVICE — GAMMEX® NON-LATEX PI TEXTURED SIZE 7.5, STERILE POLYISOPRENE POWDER-FREE SURGICAL GLOVE: Brand: GAMMEX

## (undated) DEVICE — SOL  .9 3000ML

## (undated) DEVICE — SPONGE STICK WITH PVP-I: Brand: KENDALL

## (undated) DEVICE — DRAPE,UNDRBUT,WHT GRAD PCH,CAPPORT,20/CS: Brand: MEDLINE

## (undated) DEVICE — SUTURE VICRYL 0 CT-1

## (undated) DEVICE — PROXIMATE SKIN STAPLERS (35 WIDE) CONTAINS 35 STAINLESS STEEL STAPLES (FIXED HEAD): Brand: PROXIMATE

## (undated) DEVICE — SYRINGE 30ML LL TIP

## (undated) DEVICE — DRAIN RELIAVAC 100CC

## (undated) DEVICE — PROXIMATE RELOADABLE LINEAR STAPLER: Brand: PROXIMATE

## (undated) DEVICE — Device

## (undated) DEVICE — VIOLET BRAIDED (POLYGLACTIN 910), SYNTHETIC ABSORBABLE SUTURE: Brand: COATED VICRYL

## (undated) DEVICE — SIGMOIDOSCOPE LIGHTED BIOSEAL

## (undated) DEVICE — TUBING CYSTO

## (undated) DEVICE — SUTURE PROLENE 2-0 SH

## (undated) DEVICE — SUTURE PDS II 1 ST-21

## (undated) DEVICE — DRAPE LEGGING STERILE

## (undated) DEVICE — SUTURE SILK 3-0 SH

## (undated) DEVICE — LAPAROTOMY CDS: Brand: MEDLINE INDUSTRIES, INC.

## (undated) DEVICE — LIGASURE IMPACT OPEN DEVICE

## (undated) DEVICE — SUTURE VICRYL 2-0

## (undated) DEVICE — BAG DRAIN INFECTION CNTRL 2000

## (undated) DEVICE — SOL  .9 1000ML BTL

## (undated) DEVICE — HEX-LOCKING BLADE ELECTRODE: Brand: EDGE

## (undated) DEVICE — MEDI-VAC TUBING CONNECTOR 6-IN-1 "Y" POLYPROPYLENE: Brand: CARDINAL HEALTH

## (undated) DEVICE — PROXIMATE RELOADABLE LINEAR CUTTER WITH SAFETY LOCK-OUT.  55MM LINEAR CUTTER.: Brand: PROXIMATE

## (undated) DEVICE — SIGMOIDOSCOPE DISP STERILE

## (undated) DEVICE — REM POLYHESIVE ADULT PATIENT RETURN ELECTRODE: Brand: VALLEYLAB

## (undated) DEVICE — KENDALL SCD EXPRESS SLEEVES, KNEE LENGTH, MEDIUM: Brand: KENDALL SCD

## (undated) DEVICE — COVER,MAYO STAND,STERILE: Brand: MEDLINE

## (undated) NOTE — LETTER
[address] [facility]  [address]  [place] [address]  [phone]            11/5/18    Please excuse Saturnino Ryan, and Angie Cardoso from school as they were with a family member admitted to our [address] [place] [address]

## (undated) NOTE — LETTER
Sharlene Gresham 182 [address] [place] [address]  Zach, [address] [facility]    Consent for Operation  Date: ________01/23/2019_________                                Time: _______1200________    1.  I authorize the performance upon Estuardo Gregory the following videotape. The [facility] will not be responsible for storage or maintenance of this tape. 6. For the purpose of advancing medical education, I consent to the admittance of observers to the Operating Room.   7. I authorize the use of any specimen, organs, ti When the patient is a minor or mentally incompetent to give consent:  Signature of person authorized to consent for patient: ___________________________   Relationship to patient: ____________________________________________________    Signature of Witness these medicines may increase my risk of anesthetic complications. iv. If I am allergic to anything or have had a reaction to anesthesia before. 3. I understand how the anesthesia medicine will help me (benefits).   4. I understand that with any type of an patient’s representative) and answered their questions. The patient or their representative has agreed to have anesthesia services.     _____________________________________________________________________________  Witness        Date   Time  I have ryan

## (undated) NOTE — LETTER
[facility] [address], [address] [place] [address]    Consent for Anesthesia   1.    Harrison Cavanaugh agree to be cared for by an anesthesiologist, who is specially trained to monitor me and give me medicine to put me to sleep or keep me vision, nerves, or muscles and in extremely rare instances death. 5. My doctor has explained to me other choices available to me for my care (alternatives).   6. Pregnant Patients (“epidural”):  I understand that the risks of having an epidural (medicine g

## (undated) NOTE — LETTER
[facility]  Edvin Aaron 61 [address] [facility], [address] [place] [address]    Consent for Operation    Date: __________________    Time: _______________    1.  I authorize the performance upon Moisés Velazquez the following operation:                              Repeat procedure has been videotaped, the surgeon will obtain the original videotape. The hospital will not be responsible for storage or maintenance of this tape.     6. For the purpose of advancing medical education, I consent to the admittance of observers to t STATEMENTS REQUIRING INSERTION OR COMPLETION WERE FILLED IN.     Signature of Patient:   ___________________________    When the patient is a minor or mentally incompetent to give consent:  Signature of person authorized to consent for patient: ____________ · If I am allergic to anything or have had a reaction to anesthesia before. 3. I understand how the anesthesia medicine will help me (benefits). 4. I understand that with any type of anesthesia medicine there are risks:  a.  The most common risks are: their representative has agreed to have anesthesia services.     _____________________________________________________________________________  Witness        Date   Time  I have verified that the signature is that of the patient or patient’s representative

## (undated) NOTE — LETTER
Sharlene Gresham 182 [address] [facility], [address] [facility]     PICC LINE INSERTION CONSENT     I agree to have a Peripherally Inserted Central Catheter (PICC) placed in my arm.    1. The PICC insertion procedure, care, maintenance, risks, benefits, and c goals; and potential problems that might occur during recuperation.  They also discussed reasonable alternatives to the PICC, including risks, benefits, and side effects related to the alternatives and risks related to not receiving this procedure      ____

## (undated) NOTE — LETTER
Consent to Procedure/Sedation    Date: __________________    Time: _______________    1.  I authorize the performance upon Olga Remedies the following:    Diagnostic Cerebral Angiogram with possible stent / balloon assist, possible coiling, possib Signature of person authorized to consent for patient: Relationship to patient:  ___________________________    ___________________    Witness: ____________________     Date: ______________    Printed: 11/2/2018   8:24 AM    Patient Name: Michelle Floyd

## (undated) NOTE — LETTER
[address] [address]  PH: [phone]   Consent to Procedure/Sedation    Date: __11/2/2018_____    Time: ___8:42 AM ___    1.  I authorize the performance upon Magalis Vaughan the following:  Diagnost 8. In the event your procedure results in extended X-Ray/Fluoroscopy time, you may develop a skin reaction.     Signature of Patient:  ___________________________    Signature of person authorized to consent for patient: Relationship to patient:  __________

## (undated) NOTE — LETTER
[facility]  [address] 62 [id number] [facility], [address] [place] [address]    Consent for Operation    Date: __________________    Time: _______________    1.  I authorize the performance upon Myron Roman the following operation:                              Repeat videotape. [facility] will not be responsible for storage or maintenance of this tape. 6. For the purpose of advancing medical education, I consent to the admittance of observers to the Operating Room.     7. I authorize the use of any specimen, organs Signature of Patient:   ___________________________    When the patient is a minor or mentally incompetent to give consent:  Signature of person authorized to consent for patient: ___________________________   Relationship to patient: _____________________ 3. I understand how the anesthesia medicine will help me (benefits). 4. I understand that with any type of anesthesia medicine there are risks:  a.  The most common risks are: nausea, vomiting, sore throat, muscle soreness, damage to my eyes, mouth, or t _____________________________________________________________________________  Witness        Date   Time  I have verified that the signature is that of the patient or patient’s representative, and that it was signed before the procedure    Page 2 of 2

## (undated) NOTE — LETTER
[address] [facility] [address]  [place] [address]  [phone]              11/5/18    Please excuse Gael Taylor, and Tiffanie Rosado from school as they were with a family member admitted to our [facility]

## (undated) NOTE — LETTER
[address] [id number]  [phone]          11/5/2018    Please excuse Stoney Borja and Jay Palacios, and [address] [place] [address] Dr Pardeep Knox from school as they were with a family member admitted to our Critical Care Unit.